# Patient Record
Sex: FEMALE | Race: WHITE | Employment: UNEMPLOYED | URBAN - METROPOLITAN AREA
[De-identification: names, ages, dates, MRNs, and addresses within clinical notes are randomized per-mention and may not be internally consistent; named-entity substitution may affect disease eponyms.]

---

## 2018-02-02 ENCOUNTER — TRANSFERRED RECORDS (OUTPATIENT)
Dept: HEALTH INFORMATION MANAGEMENT | Facility: CLINIC | Age: 38
End: 2018-02-02

## 2018-02-02 LAB
HPV ABSTRACT: NORMAL
PAP-ABSTRACT: NORMAL

## 2019-07-08 ENCOUNTER — OFFICE VISIT (OUTPATIENT)
Dept: FAMILY MEDICINE | Facility: CLINIC | Age: 39
End: 2019-07-08
Payer: COMMERCIAL

## 2019-07-08 VITALS
BODY MASS INDEX: 39.18 KG/M2 | TEMPERATURE: 97.7 F | DIASTOLIC BLOOD PRESSURE: 80 MMHG | WEIGHT: 243.8 LBS | SYSTOLIC BLOOD PRESSURE: 136 MMHG | RESPIRATION RATE: 24 BRPM | OXYGEN SATURATION: 100 % | HEART RATE: 109 BPM | HEIGHT: 66 IN

## 2019-07-08 DIAGNOSIS — F41.9 ANXIETY: Primary | ICD-10-CM

## 2019-07-08 DIAGNOSIS — M25.562 BILATERAL CHRONIC KNEE PAIN: ICD-10-CM

## 2019-07-08 DIAGNOSIS — G47.9 SLEEP DISTURBANCE: ICD-10-CM

## 2019-07-08 DIAGNOSIS — G89.29 BILATERAL CHRONIC KNEE PAIN: ICD-10-CM

## 2019-07-08 DIAGNOSIS — M54.50 CHRONIC BILATERAL LOW BACK PAIN WITHOUT SCIATICA: ICD-10-CM

## 2019-07-08 DIAGNOSIS — E66.812 CLASS 2 OBESITY WITHOUT SERIOUS COMORBIDITY WITH BODY MASS INDEX (BMI) OF 39.0 TO 39.9 IN ADULT, UNSPECIFIED OBESITY TYPE: ICD-10-CM

## 2019-07-08 DIAGNOSIS — M25.561 BILATERAL CHRONIC KNEE PAIN: ICD-10-CM

## 2019-07-08 DIAGNOSIS — G89.29 CHRONIC BILATERAL LOW BACK PAIN WITHOUT SCIATICA: ICD-10-CM

## 2019-07-08 PROBLEM — I10 ESSENTIAL HYPERTENSION: Status: ACTIVE | Noted: 2019-01-29

## 2019-07-08 PROBLEM — R91.8 MULTIPLE LUNG NODULES ON CT: Status: ACTIVE | Noted: 2018-02-21

## 2019-07-08 PROCEDURE — 99204 OFFICE O/P NEW MOD 45 MIN: CPT | Performed by: NURSE PRACTITIONER

## 2019-07-08 RX ORDER — SUMATRIPTAN 100 MG/1
TABLET, FILM COATED ORAL PRN
Refills: 5 | COMMUNITY
Start: 2019-07-02

## 2019-07-08 RX ORDER — AMITRIPTYLINE HYDROCHLORIDE 50 MG/1
1 TABLET ORAL AT BEDTIME
Refills: 3 | COMMUNITY
Start: 2019-07-02 | End: 2020-01-28

## 2019-07-08 RX ORDER — MIRTAZAPINE 30 MG/1
30 TABLET, FILM COATED ORAL AT BEDTIME
Qty: 90 TABLET | Refills: 1 | Status: SHIPPED | OUTPATIENT
Start: 2019-07-08 | End: 2019-11-20

## 2019-07-08 RX ORDER — OMEPRAZOLE 40 MG/1
40 CAPSULE, DELAYED RELEASE ORAL DAILY
COMMUNITY
Start: 2019-04-26

## 2019-07-08 RX ORDER — CHLORTHALIDONE 25 MG/1
25 TABLET ORAL DAILY
COMMUNITY
Start: 2019-04-26

## 2019-07-08 RX ORDER — MELOXICAM 5 MG/1
1 CAPSULE ORAL DAILY
COMMUNITY
End: 2019-08-07

## 2019-07-08 RX ORDER — ZOLPIDEM TARTRATE 5 MG/1
5 TABLET ORAL
COMMUNITY
Start: 2019-04-26 | End: 2019-07-08

## 2019-07-08 RX ORDER — ZOLPIDEM TARTRATE 5 MG/1
5 TABLET ORAL
Qty: 15 TABLET | Refills: 5 | Status: SHIPPED | OUTPATIENT
Start: 2019-07-08 | End: 2019-09-03

## 2019-07-08 RX ORDER — CELECOXIB 100 MG/1
400 CAPSULE ORAL DAILY
COMMUNITY
End: 2019-07-31

## 2019-07-08 RX ORDER — MIRTAZAPINE 15 MG/1
30 TABLET, FILM COATED ORAL AT BEDTIME
COMMUNITY
Start: 2019-07-02 | End: 2019-07-08

## 2019-07-08 RX ORDER — HYDROXYZINE HYDROCHLORIDE 10 MG/1
10-40 TABLET, FILM COATED ORAL AT BEDTIME
COMMUNITY
Start: 2019-04-26

## 2019-07-08 RX ORDER — FLUTICASONE PROPIONATE 220 UG/1
1 AEROSOL, METERED RESPIRATORY (INHALATION) DAILY
Refills: 5 | COMMUNITY
Start: 2019-06-30

## 2019-07-08 RX ORDER — ONDANSETRON 4 MG/1
TABLET, ORALLY DISINTEGRATING ORAL PRN
COMMUNITY
Start: 2019-04-26

## 2019-07-08 RX ORDER — CYCLOBENZAPRINE HCL 10 MG
10 TABLET ORAL AT BEDTIME
COMMUNITY
Start: 2019-04-26 | End: 2019-09-03

## 2019-07-08 RX ORDER — POTASSIUM CHLORIDE 1500 MG/1
20 TABLET, EXTENDED RELEASE ORAL DAILY
COMMUNITY
Start: 2019-04-26 | End: 2020-03-20

## 2019-07-08 RX ORDER — ATENOLOL 100 MG/1
100 TABLET ORAL DAILY
COMMUNITY
Start: 2019-04-26

## 2019-07-08 RX ORDER — DULOXETIN HYDROCHLORIDE 60 MG/1
60 CAPSULE, DELAYED RELEASE ORAL DAILY
COMMUNITY
Start: 2019-04-26 | End: 2020-01-28

## 2019-07-08 RX ORDER — ALBUTEROL SULFATE 90 UG/1
1 AEROSOL, METERED RESPIRATORY (INHALATION) DAILY
Refills: 5 | COMMUNITY
Start: 2019-05-23

## 2019-07-08 SDOH — HEALTH STABILITY: MENTAL HEALTH: HOW OFTEN DO YOU HAVE A DRINK CONTAINING ALCOHOL?: NEVER

## 2019-07-08 ASSESSMENT — PAIN SCALES - GENERAL: PAINLEVEL: SEVERE PAIN (7)

## 2019-07-08 ASSESSMENT — MIFFLIN-ST. JEOR: SCORE: 1847.62

## 2019-07-08 NOTE — PATIENT INSTRUCTIONS
Continue all medications without any changes    Schedule with Nutritionist     Try to increase daily cardio activity - at least 30 minutes a day     Schedule with Pain Management and Psychiatry     Would also be a good idea to start seeing a therapist to work on managing Anxiety  - some resources are provided below     Get feet up as able     Return to clinic in 3 months or sooner if needed

## 2019-07-08 NOTE — Clinical Note
Please abstract the following data from this visit with this patient into the appropriate field in Epic:Pap smear done on this date: 2/2/2018 (approximately), by this group: [facility], results were negative. ANDHPV done on this date: 2/2/2018 (approximately), by this group: [facility], results were negative

## 2019-07-08 NOTE — NURSING NOTE
"Chief Complaint   Patient presents with     Back Pain     Establish Care     wants to establish care until late fall-staying here for summer and fall       Initial /80   Pulse 109   Temp 97.7  F (36.5  C)   Resp 24   Ht 1.676 m (5' 6\")   Wt 110.6 kg (243 lb 12.8 oz)   SpO2 100%   Breastfeeding? No   BMI 39.35 kg/m   Estimated body mass index is 39.35 kg/m  as calculated from the following:    Height as of this encounter: 1.676 m (5' 6\").    Weight as of this encounter: 110.6 kg (243 lb 12.8 oz).    Patient presents to the clinic using No DME    Health Maintenance that is potentially due pending provider review:  Pap Smear    Pt reported. Sent to abstracting.    Is there anyone who you would like to be able to receive your results? not asked  If yes have patient fill out DANG    "

## 2019-07-08 NOTE — PROGRESS NOTES
SUBJECTIVE   Helena Berrios is a  female who presents to clinic today for the following health issue(s):     Chief Complaint   Patient presents with     Establish Care     wants to establish care until late fall-staying here for summer and fall     Pain     has chronic pain, requesting referral to pain clinic and psychiatrist   Musculoskeletal problem/pain      Duration: year plus/Chronic    Description  Location: Lower Back and bilateral knee pain chronic    Intensity:  moderate    Accompanying signs and symptoms: none    History  Previous similar problem: YES  Past Surgical History:   Procedure Laterality Date     ANKLE SURGERY Left ,      KNEE SURGERY Right 2007 x 2 surgeries     SHOULDER SURGERY Right 2012       Previous evaluation:  yes    Precipitating or alleviating factors:  Trauma or overuse: YES- knee pain from playing basketball and back pain from lifting heavy things  Aggravating factors include: sitting and stairs for knees and walking for back    Therapies tried and outcome: exercises (physical therapy), acupuncture not help and medications-Gabapentin was not helpful    Patient states was to go to pain clinic and psychiatrist, is looking for referrals in the area      If put hand on low back and it burns  Did acupuncture x 5 sessions for knees and back and pain was short lived  Did symbiscq injections and cortisone injections previously   History bilateral knee arthroscopies   No knee injuries or trauma    Back more bothersome in the last year, thinks due to weight  Is puffy - has had workup done on this   Was suppose to see a dietician in [place], but came up this way to care for family so hasn't gone   Dad with history of numerous MI's, first one was at age 37. Grandfather  of MI      Anxiety  Is currently on medication - was seeing psychiatry in [place]   Hasn't done counseling/therapy   Has been on multiple medications in the past, patient can't remember all        PCP   No primary  "care provider on file. None    Health Maintenance        Health Maintenance Due   Topic Date Due     PREVENTIVE CARE VISIT  1980     HIV SCREENING  11/30/1995   Pap and HPV done 2/2/2018-sent to abstracting    HPI        Patient Active Problem List   Diagnosis     Anxiety     Chronic pain of both knees     Essential hypertension     Backache     Multiple lung nodules on CT     Sleep disturbance     Current Outpatient Medications   Medication     atenolol (TENORMIN) 100 MG tablet     chlorthalidone (HYGROTON) 25 MG tablet     cyclobenzaprine (FLEXERIL) 10 MG tablet     DULoxetine (CYMBALTA) 60 MG capsule     hydrOXYzine (ATARAX) 10 MG tablet     levonorgestrel (MIRENA, 52 MG,) 20 MCG/24HR IUD     mirtazapine (REMERON) 30 MG tablet     omeprazole (PRILOSEC) 40 MG DR capsule     ondansetron (ZOFRAN-ODT) 4 MG ODT tab     order for DME     potassium chloride ER (K-DUR/KLOR-CON M) 20 MEQ CR tablet     Sharps Container (SHARPS-A-GATOR LOCKING BRACKET) MISC     zolpidem (AMBIEN) 5 MG tablet     amitriptyline (ELAVIL) 50 MG tablet     celecoxib (CELEBREX) 100 MG capsule     FLOVENT  MCG/ACT inhaler     Meloxicam 5 MG CAPS     ranitidine (ZANTAC) 300 MG tablet     SUMAtriptan (IMITREX) 100 MG tablet     VENTOLIN  (90 Base) MCG/ACT inhaler     No current facility-administered medications for this visit.        Reviewed and updated as needed this visit by Provider:  Tobacco  Allergies  Meds  Med Hx  Surg Hx  Fam Hx  Soc Hx     ROS:  Constitutional, neuro, ENT, endocrine, pulmonary, cardiac, gastrointestinal, genitourinary, musculoskeletal, integument and psychiatric systems are negative, except as otherwise noted.    PHYSICAL EXAM   /80   Pulse 109   Temp 97.7  F (36.5  C)   Resp 24   Ht 1.676 m (5' 6\")   Wt 110.6 kg (243 lb 12.8 oz)   SpO2 100%   Breastfeeding? No   BMI 39.35 kg/m    Body mass index is 39.35 kg/m .  GENERAL APPEARANCE: healthy, alert and no distress  EYES: Eyes grossly " normal to inspection, PERRL and conjunctivae and sclerae normal  HENT: ear canals and TM's normal and nose and mouth without ulcers or lesions  NECK: no adenopathy, no asymmetry, masses, or scars and thyroid normal to palpation  RESP: lungs clear to auscultation - no rales, rhonchi or wheezes  CV: regular rates and rhythm, normal S1 S2, no S3 or S4 and no murmur, click or rub  ABDOMEN: soft, nontender, without hepatosplenomegaly or masses, bowel sounds normal and obese  MS: extremities normal- no gross deformities noted, peripheral pulses normal and no edema  ORTHO: Knee Exam: Inspection: AP/lateral alignment normal, No effusion  Tender: generalized tenderness   Non-tender: remainder non-tender  Active Range of Motion: full flexion, no pain with flexion, full extension, no pain with extension  Strength: quad  5/5, Hamstrings  5/5, Gastroc  5/5, Tibialis anterior  5/5 and Peroneals  5/5    Lumber/Thoracic Spine Exam: Tender:  lumbar spinous processes, left para lumbar muscles, right para lumbar muscles  Non-tender:  Remainder non-tender  Range of Motion:  lumbar flexion  full, lumbar extension  full, left lateral lumbar bending  full, right lateral lumbar bending  full, left lateral lumbar rotation  full, right lateral lumbar rotation  full  Strength:  able to heel walk, able to toe walk, gastrocsoleus   5/5, hamstrings  5/5, quadriceps  5/5, tibialis anterior  5/5, peroneals  5/5  Special tests:  negative straight leg raises    SKIN: no suspicious lesions or rashes  NEURO: Normal strength and tone, mentation intact, speech normal and DTR symmetrically normal in lower extremities  PSYCH: mentation appears normal and affect normal/bright      Diagnostic Test Results:  none     ASSESSMENT & PLAN   Assessment & Plan     1. Anxiety  Chronic, was seeing psychiatry at last clinic. Patient up taking care of family currently. Would like to find a psychiatrist closer for medication management. Continue Remeron, schedule with  "psychiatry for medication management. Also discussed therapy/counseling.   - MENTAL HEALTH REFERRAL  - Adult; Psychiatry and Medication Management; Psychiatry; [facility]: [facility] [phone].  Medication management & future refills will be returned to [facility] PCP upon completion of evaluation; We july...  - mirtazapine (REMERON) 30 MG tablet; Take 1 tablet (30 mg) by mouth At Bedtime  Dispense: 90 tablet; Refill: 1    2. Sleep disturbance  Chronic, stable on Ambien. Discussed bedtime hygiene as well. Continue Ambien without any changes.   - zolpidem (AMBIEN) 5 MG tablet; Take 1 tablet (5 mg) by mouth nightly as needed for sleep To last 30 days  Dispense: 15 tablet; Refill: 5    3. Chronic bilateral low back pain without sciatica  Chronic, stable. Has trialed multiple therapies as well as physical therapy. Pain management referral placed.   - PAIN MANAGEMENT REFERRAL    4. Bilateral chronic knee pain  Chronic, still painful. Has had bilateral arthroscopies. Schedule with pain management.   - PAIN MANAGEMENT REFERRAL    5. Class 2 obesity without serious comorbidity with body mass index (BMI) of 39.0 to 39.9 in adult, unspecified obesity type  Chronic, was suppose to schedule with nutrition before temporary move. Nutrition referral placed.   - NUTRITION REFERRAL     BMI:   Estimated body mass index is 39.35 kg/m  as calculated from the following:    Height as of this encounter: 1.676 m (5' 6\").    Weight as of this encounter: 110.6 kg (243 lb 12.8 oz).   Weight management plan: Discussed healthy diet and exercise guidelines and Nutrition referral        Patient Instructions   Continue all medications without any changes    Schedule with Nutritionist     Try to increase daily cardio activity - at least 30 minutes a day     Schedule with Pain Management and Psychiatry     Would also be a good idea to start seeing a therapist to work on managing Anxiety  - some resources are provided below     Get " feet up as able     Return to clinic in 3 months or sooner if needed          Return in about 3 years (around 7/8/2022) for Recheck.    Risks, benefits, side effects and rationale for treatment plan fully discussed with the patient and understanding expressed.    ESTEBAN Giraldo CNP   [facility]

## 2019-07-24 ENCOUNTER — OFFICE VISIT (OUTPATIENT)
Dept: FAMILY MEDICINE | Facility: CLINIC | Age: 39
End: 2019-07-24
Payer: COMMERCIAL

## 2019-07-24 VITALS
OXYGEN SATURATION: 96 % | WEIGHT: 239 LBS | DIASTOLIC BLOOD PRESSURE: 70 MMHG | HEART RATE: 112 BPM | BODY MASS INDEX: 38.58 KG/M2 | TEMPERATURE: 98.3 F | SYSTOLIC BLOOD PRESSURE: 128 MMHG | RESPIRATION RATE: 18 BRPM

## 2019-07-24 DIAGNOSIS — E66.01 MORBID OBESITY (H): ICD-10-CM

## 2019-07-24 DIAGNOSIS — G89.29 CHRONIC BILATERAL LOW BACK PAIN WITHOUT SCIATICA: ICD-10-CM

## 2019-07-24 DIAGNOSIS — R06.00 PND (PAROXYSMAL NOCTURNAL DYSPNEA): ICD-10-CM

## 2019-07-24 DIAGNOSIS — R91.8 PULMONARY NODULES: ICD-10-CM

## 2019-07-24 DIAGNOSIS — M54.50 CHRONIC BILATERAL LOW BACK PAIN WITHOUT SCIATICA: ICD-10-CM

## 2019-07-24 DIAGNOSIS — R60.0 BILATERAL LOWER EXTREMITY EDEMA: Primary | ICD-10-CM

## 2019-07-24 LAB
ALBUMIN SERPL-MCNC: 3.6 G/DL (ref 3.4–5)
ALP SERPL-CCNC: 118 U/L (ref 40–150)
ALT SERPL W P-5'-P-CCNC: 32 U/L (ref 0–50)
ANION GAP SERPL CALCULATED.3IONS-SCNC: 3 MMOL/L (ref 3–14)
AST SERPL W P-5'-P-CCNC: 15 U/L (ref 0–45)
BILIRUB SERPL-MCNC: 0.2 MG/DL (ref 0.2–1.3)
BUN SERPL-MCNC: 6 MG/DL (ref 7–30)
CALCIUM SERPL-MCNC: 8.8 MG/DL (ref 8.5–10.1)
CHLORIDE SERPL-SCNC: 106 MMOL/L (ref 94–109)
CO2 SERPL-SCNC: 29 MMOL/L (ref 20–32)
CREAT SERPL-MCNC: 0.86 MG/DL (ref 0.52–1.04)
GFR SERPL CREATININE-BSD FRML MDRD: 85 ML/MIN/{1.73_M2}
GLUCOSE SERPL-MCNC: 87 MG/DL (ref 70–99)
POTASSIUM SERPL-SCNC: 3.9 MMOL/L (ref 3.4–5.3)
PROT SERPL-MCNC: 7.4 G/DL (ref 6.8–8.8)
SODIUM SERPL-SCNC: 138 MMOL/L (ref 133–144)

## 2019-07-24 PROCEDURE — 36415 COLL VENOUS BLD VENIPUNCTURE: CPT | Performed by: NURSE PRACTITIONER

## 2019-07-24 PROCEDURE — 99214 OFFICE O/P EST MOD 30 MIN: CPT | Performed by: NURSE PRACTITIONER

## 2019-07-24 PROCEDURE — 80053 COMPREHEN METABOLIC PANEL: CPT | Performed by: NURSE PRACTITIONER

## 2019-07-24 RX ORDER — FUROSEMIDE 20 MG
1 TABLET ORAL DAILY
COMMUNITY
Start: 2018-08-30

## 2019-07-24 NOTE — PROGRESS NOTES
SUBJECTIVE   Helena Berrios is a  female who presents to clinic today for the following health issue(s):       Back Pain       Duration: chronic but worse past week or so        Specific cause: none    Description:   Location of pain: low back both and bilateral leg pain  Character of pain: sharp  Pain radiation:radiates into the right leg and radiates into the left leg  New numbness or weakness in legs, not attributed to pain:  no     Intensity: At its worst 10/10    History:   Pain interferes with job: YES  History of back problems: back problems for 1-2 years, does not see specialist  Any previous MRI or X-rays: None  Sees a specialist for back pain:  No  Therapies tried without relief: tens unit    Alleviating factors:   Improved by: sleep      Precipitating factors:  Worsened by: Lifting, Bending and Standing    Accompanying Signs & Symptoms:  Risk of Fracture:  None  Risk of Cauda Equina:  None  Risk of Infection:  None  Risk of Cancer:  None  Risk of Ankylosing Spondylitis:  Onset at age <35, male, AND morning back stiffness. no     Was seen in clinic approximately 2 weeks ago and was provided a pain management referral, which patient cancelled. Patient reports seeing pain clinic on Monday in [place]- see's again on September 3rd       edema      Duration: 1 week    Description (location/character/radiation): hand, ankle and feet swelling    Intensity:  severe    Accompanying signs and symptoms: racing heart    History (similar episodes/previous evaluation): has had in past but no known diagnosis    Precipitating or alleviating factors: None, father has CHF so patient is concerned    Therapies tried and outcome: Lasix and potassium has not helped       L > R; up into lower legs more   No significant salt intake  Having nocturnal dyspnea    Tried compression stockings - are helpful in winter   Dad just had MI so is worried about this  Was ordered to have a stress echo in July of 2018 - no results seen.  Patient reports not having.    PCP   Physician No Ref-Primary None    Health Maintenance        Health Maintenance Due   Topic Date Due     PREVENTIVE CARE VISIT  1980     HIV SCREENING  11/30/1995       HPI        Patient Active Problem List   Diagnosis     Anxiety     Chronic pain of both knees     Essential hypertension     Backache     Multiple lung nodules on CT     Sleep disturbance     Obesity (BMI 35.0-39.9) with comorbidity (H)     Segmental dysfunction of sacral region     Segmental dysfunction of lumbar region     Segmental dysfunction of thoracic region     Segmental dysfunction of lower extremity     Lumbago     Current Outpatient Medications   Medication     amitriptyline (ELAVIL) 50 MG tablet     atenolol (TENORMIN) 100 MG tablet     celecoxib (CELEBREX) 100 MG capsule     chlorthalidone (HYGROTON) 25 MG tablet     cyclobenzaprine (FLEXERIL) 10 MG tablet     DULoxetine (CYMBALTA) 60 MG capsule     FLOVENT  MCG/ACT inhaler     hydrOXYzine (ATARAX) 10 MG tablet     levonorgestrel (MIRENA, 52 MG,) 20 MCG/24HR IUD     Meloxicam 5 MG CAPS     mirtazapine (REMERON) 30 MG tablet     omeprazole (PRILOSEC) 40 MG DR capsule     ondansetron (ZOFRAN-ODT) 4 MG ODT tab     order for DME     potassium chloride ER (K-DUR/KLOR-CON M) 20 MEQ CR tablet     ranitidine (ZANTAC) 300 MG tablet     Sharps Container (SHARPS-A-GATOR LOCKING BRACKET) MISC     SUMAtriptan (IMITREX) 100 MG tablet     VENTOLIN  (90 Base) MCG/ACT inhaler     zolpidem (AMBIEN) 5 MG tablet     furosemide (LASIX) 20 MG tablet     order for DME     No current facility-administered medications for this visit.        Reviewed and updated as needed this visit by Provider:  Tobacco  Allergies  Meds  Med Hx  Surg Hx  Fam Hx  Soc Hx     ROS:  Constitutional, neuro, ENT, endocrine, pulmonary, cardiac, gastrointestinal, genitourinary, musculoskeletal, integument and psychiatric systems are negative, except as otherwise  noted.    PHYSICAL EXAM   /70   Pulse 112   Temp 98.3  F (36.8  C)   Resp 18   Wt 108.4 kg (239 lb)   SpO2 96%   Breastfeeding? No   BMI 38.58 kg/m    Body mass index is 38.58 kg/m .  GENERAL APPEARANCE: healthy, alert and no distress  EYES: Eyes grossly normal to inspection, PERRL and conjunctivae and sclerae normal  HENT: ear canals and TM's normal and nose and mouth without ulcers or lesions  NECK: no adenopathy, no asymmetry, masses, or scars and thyroid normal to palpation  RESP: lungs clear to auscultation - no rales, rhonchi or wheezes  CV: regular rates and rhythm, normal S1 S2, no S3 or S4 and no murmur, click or rub  ABDOMEN: soft, nontender, without hepatosplenomegaly or masses, bowel sounds normal and obese  MS: extremities normal- no gross deformities noted, peripheral pulses normal and 2+ pitting edema on left and 1+ edema on right  SKIN: no suspicious lesions or rashes  NEURO: Normal strength and tone, mentation intact and speech normal  PSYCH: mentation appears normal and affect normal/bright      Diagnostic Test Results:  Pending     ASSESSMENT & PLAN   Assessment & Plan     1. Bilateral lower extremity edema  Patient has persistent bilateral lower extremities swelling, had seen previous primary care provider for and has been on Lasix. Patient was also advised to have stress echo completed approximately 1 year ago, no records seen and patient reports never having done. Due to bilateral lower extremity swelling will get repeat lab work, schedule for Echo and also get SABRINA. Advised patient on low sodium diet and elevation of lower extremities.   - Comprehensive metabolic panel (BMP + Alb, Alk Phos, ALT, AST, Total. Bili, TP)  - Echocardiogram Complete; Future  - US SABRINA Doppler No Exercise; Future    2. Pulmonary nodules  History of pulmonary nodules on CT scan in 2018. It was recommended at that time to repeat in 6 months. Order placed.   - CT Chest w Contrast; Future    3. PND (paroxysmal  "nocturnal dyspnea)    - Echocardiogram Complete; Future    4. Morbid obesity (H)  Discussed risks of increased weight and diet and exercise to help manage.     5. Chronic bilateral low back pain without sciatica  Chronic, ordered for pain management referral at last office visit, however patient cancelled. Patient reports seeing pain management in [place]. Will get DANG for records. Patient was unable to provide name of place. Took DANG to complete and return to clinic.        BMI:   Estimated body mass index is 38.58 kg/m  as calculated from the following:    Height as of 7/8/19: 1.676 m (5' 6\").    Weight as of this encounter: 108.4 kg (239 lb).   Weight management plan: Discussed healthy diet and exercise guidelines        Patient Instructions   Schedule Echocardiogram of the heart at [phone]    Will also do an ultrasound of lower legs call [phone] to set this up.     Could see if you could get them both scheduled for the same day    Lab work today  - will call you with results     Will also call with Echo and ultrasound results     Try to increase exercise and stick to a healthy diet. Would still encourage to meet with nutritionist.    See pain management as scheduled - will get a release of information for your visit with them.     Should not be on lasix per records    Will determine follow up after tests all back       Return in about 1 month (around 8/24/2019) for Recheck.    Risks, benefits, side effects and rationale for treatment plan fully discussed with the patient and understanding expressed.    ESTEBAN Giraldo [facility]    "

## 2019-07-24 NOTE — NURSING NOTE
"Chief Complaint   Patient presents with     Back Pain       Initial Breastfeeding? No  Estimated body mass index is 39.35 kg/m  as calculated from the following:    Height as of 7/8/19: 1.676 m (5' 6\").    Weight as of 7/8/19: 110.6 kg (243 lb 12.8 oz).    Patient presents to the clinic using No DME    Health Maintenance that is potentially due pending provider review:  NONE    n/a    Is there anyone who you would like to be able to receive your results? not asked  If yes have patient fill out DANG    "

## 2019-07-24 NOTE — PATIENT INSTRUCTIONS
Schedule Echocardiogram of the heart at [phone]    Will also do an ultrasound of lower legs call [phone] to set this up.     Could see if you could get them both scheduled for the same day    Lab work today  - will call you with results     Will also call with Echo and ultrasound results     Try to increase exercise and stick to a healthy diet. Would still encourage to meet with nutritionist.    See pain management as scheduled - will get a release of information for your visit with them.     Should not be on lasix per records    Will determine follow up after tests all back

## 2019-07-26 ENCOUNTER — MYC MEDICAL ADVICE (OUTPATIENT)
Dept: FAMILY MEDICINE | Facility: CLINIC | Age: 39
End: 2019-07-26

## 2019-07-26 ENCOUNTER — THERAPY VISIT (OUTPATIENT)
Dept: CHIROPRACTIC MEDICINE | Facility: CLINIC | Age: 39
End: 2019-07-26
Payer: COMMERCIAL

## 2019-07-26 DIAGNOSIS — M99.06 SEGMENTAL DYSFUNCTION OF LOWER EXTREMITY: ICD-10-CM

## 2019-07-26 DIAGNOSIS — M99.03 SEGMENTAL DYSFUNCTION OF LUMBAR REGION: ICD-10-CM

## 2019-07-26 DIAGNOSIS — M99.04 SEGMENTAL DYSFUNCTION OF SACRAL REGION: Primary | ICD-10-CM

## 2019-07-26 DIAGNOSIS — M54.50 LUMBAGO: ICD-10-CM

## 2019-07-26 DIAGNOSIS — M99.02 SEGMENTAL DYSFUNCTION OF THORACIC REGION: ICD-10-CM

## 2019-07-26 PROCEDURE — 98941 CHIROPRACT MANJ 3-4 REGIONS: CPT | Mod: AT | Performed by: CHIROPRACTOR

## 2019-07-26 PROCEDURE — 99203 OFFICE O/P NEW LOW 30 MIN: CPT | Mod: 25 | Performed by: CHIROPRACTOR

## 2019-07-26 PROCEDURE — 98943 CHIROPRACT MANJ XTRSPINL 1/>: CPT | Performed by: CHIROPRACTOR

## 2019-07-26 NOTE — PROGRESS NOTES
"Initial Chiropractic Clinic Visit    PCP: No Ref-Primary, Physician    Helena Berrios is a 38 year old female who is seen  as a self referral presenting with lower back pain and bilateral knee pain that she attributes to possible her weight gain. It has been ongoing for about 5 years. She believes that repetitive use may also be a factor. She rates her current pain 8/10. She points to pain in her central lower back around the L5 region, and she describes it as burning. Her knee pain has been worsening the last 12 years, the LBP the last 6 months. There is no known cause, but she has gained a lot of weight the last year. When she was mowing the last last night, she had some radiation of symptoms down to her knees and they feel weak. She is taking Flexeril for pain, but that is for her neck. She has not used ice or heat, but the TENs unit is just a quick fix. She is not sleeping well at night. She went to a chiropractor about 20 years ago. She has had injections in her knees helped for a couple weeks, she has also had PT which did not work.         Injury: None known    Location of Pain: central L5 pain and bilateral superior aspect of knees  Duration of Pain: \"years\"   Rating of Pain at worst: 10/10  Rating of Pain Currently: 8/10  Symptoms are better with: Nothing  Symptoms are worse with: repetitive chores, lawn, dishes  Additional Features: some radiation of symptoms to knees     Health History  as reported by the patient:    How does the patient rate their own health:   Good    Current or past medical history:   Calf pain/knee, Overweight, Pain at night/rest and Smoking - patient notes that she just cut back to \"one a day\"    Medical allergies:  Multiple - see above    Past Traumas/Surgeries:  Ortho - knee 2, ankle 3, shoulder 1    Family History:  Cancer, DM2, heart disease    Medications:  Muscle relaxants and Sleep    Occupation:  None, took summer off while she lives with aunt; 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  Disability score: 34%. Keel Start Total Score:7 Sub Score: 4     Review of Systems  Musculoskeletal: as above  Remainder of review of systems is negative including constitutional, CV, pulmonary, GI, Skin and Neurologic except as noted in HPI or medical history.    No past medical history on file.  Past Surgical History:   Procedure Laterality Date     ANKLE SURGERY Left 1997, 2003     KNEE SURGERY Right 2007 x 2 surgeries     SHOULDER SURGERY Right 2012     Objective  There were no vitals taken for this visit.      GENERAL APPEARANCE: healthy, alert and no distress   GAIT: NORMAL  SKIN: no suspicious lesions or rashes  NEURO: Normal strength and tone, mentation intact and speech normal  PSYCH:  mentation appears normal and affect normal/bright    Low back exam:    Inspection:       no visible deformity in the low back    ROM:       WNL but Extension and Rotation cause \"burning\"    Tender:  L5 region, superior aspect of bilateral knees      Strength:       ankle dorsiflexion 5/5 Normal       ankle plantarflexion 5/5 Normal       dorsiflexion of the great toe 5/5 Normal    Reflexes:       patellar (L3, L4) 2 bilaterally    Sensation:      grossly intact throughout lower extremities    Special tests:  Heel walk - Right negative and Left negative, Toe walk -  Right negative and Left negative, Milgrams - negative, Valsalva - negative, Kemps - Right negative and Left negative, SLR - Right negative and Left negative, Gaenslen's - Right negative and Left negative, Fabere - Right negative and Left negative, Yeoman's - Right negative and Left negative, Clinton - Right positive and Left positive and Ely's - Right positive and Left positive    Segmental spinal " dysfunction/restrictions found at:  T1 , T5 , T10, L4 , PSIS Right  and Extra-spinal:      Muscle spasm found in:Gluteal and Lumbar erector spine      Radiology:  None warranted at this time, consider if no improvement with conservative management.     Assessment:    No diagnosis found.    RX ordered/plan of care: Mechanical LBP and bilateral knee pain with associated myospasm and intersegmental dysfunction.   Anticipated outcomes: Patient is expected to get relief with care.   Possible risks and side effects: Minimal soreness expected post-adjustment.     After discussing the risk and benefits of care, patient consented to treatment.    Prognosis: Good      Patient's condition:  Patient had restrictions pre-manipulation and Patient had decreased motion prior to manipulation    Treatment effectiveness:  Post manipulation there is better intersegmental movement and Patient claims to feel looser post manipulation      Plan:    Procedures:  Evaluation and Management:  54489 Moderate level exam 30 min    CMT:  27920 Chiropractic manipulative treatment 3-4 regions performed   30803 Chiropractic manipulative treatment extraspinal dysfunction/restriction  Thoracic: Diversified, T1, T5, T10, Prone  Lumbar: Drop Table, L4, Prone  Pelvis: Drop Table, PSIS Right , Prone  Extra-spinal: Drop Table, bilateral knees from S to I and fibular head from A to P with Thuli Board, Supine    Modalities:  Flexion Distraction to stretch lumbar paraspinals.     Therapeutic procedures:  Gave patient Ice instructions post adjustment, and instructions for acute care    Response to Treatment:  Patient tolerated treatment well today.       Treatment plan and goals:  Goals:  Decrease pain in lower back and bilateral knees.  Support patient in returning to work and weight loss.     Frequency of care  Duration of care is estimated to be 6 weeks, from the initial treatment.  It is estimated that the patient will need a total of 8 visits to resolve  this episode.  For the initial therapeutic trial of care, the frequency is recommended at 1-2 times per week.  A reevaluation would be clinically appropriate in 8 visits, to determine progress and further course of care.    In-Office Treatment  Evaluation  Spinal Chiropractic Manipulative Therapy:  Trial of care - re-evaluate after 8 visits.         Recommendations:    Instructions:  ice 20 minutes every other hour as needed, heat 15 minutes every other hour as needed and use TENs unit    Follow-up:    Return to care next week.       Discussed the assessment with the patient.      Disclaimer: This note consists of symbols derived from keyboarding, dictation and/or voice recognition software. As a result, there may be errors in the script that have gone undetected. Please consider this when interpreting information found in this chart.

## 2019-07-29 NOTE — TELEPHONE ENCOUNTER
Left message for patient to call back. Need to know where she wants us to send this script for the TENS unit. Script is on CSS desk in [place].    Kristy [facility] [place]

## 2019-07-31 ENCOUNTER — MYC MEDICAL ADVICE (OUTPATIENT)
Dept: PALLIATIVE MEDICINE | Facility: CLINIC | Age: 39
End: 2019-07-31

## 2019-07-31 ENCOUNTER — OFFICE VISIT (OUTPATIENT)
Dept: PALLIATIVE MEDICINE | Facility: CLINIC | Age: 39
End: 2019-07-31
Payer: COMMERCIAL

## 2019-07-31 ENCOUNTER — THERAPY VISIT (OUTPATIENT)
Dept: CHIROPRACTIC MEDICINE | Facility: CLINIC | Age: 39
End: 2019-07-31
Payer: COMMERCIAL

## 2019-07-31 VITALS
HEIGHT: 66 IN | WEIGHT: 240 LBS | SYSTOLIC BLOOD PRESSURE: 138 MMHG | TEMPERATURE: 98.6 F | DIASTOLIC BLOOD PRESSURE: 80 MMHG | BODY MASS INDEX: 38.57 KG/M2

## 2019-07-31 DIAGNOSIS — M54.50 CHRONIC BILATERAL LOW BACK PAIN WITHOUT SCIATICA: ICD-10-CM

## 2019-07-31 DIAGNOSIS — M25.561 CHRONIC PAIN OF BOTH KNEES: Primary | ICD-10-CM

## 2019-07-31 DIAGNOSIS — G89.29 CHRONIC BILATERAL LOW BACK PAIN WITHOUT SCIATICA: ICD-10-CM

## 2019-07-31 DIAGNOSIS — M99.04 SEGMENTAL DYSFUNCTION OF SACRAL REGION: Primary | ICD-10-CM

## 2019-07-31 DIAGNOSIS — M99.02 SEGMENTAL DYSFUNCTION OF THORACIC REGION: ICD-10-CM

## 2019-07-31 DIAGNOSIS — M99.03 SEGMENTAL DYSFUNCTION OF LUMBAR REGION: ICD-10-CM

## 2019-07-31 DIAGNOSIS — M99.06 SEGMENTAL DYSFUNCTION OF LOWER EXTREMITY: ICD-10-CM

## 2019-07-31 DIAGNOSIS — G47.00 INSOMNIA, UNSPECIFIED TYPE: ICD-10-CM

## 2019-07-31 DIAGNOSIS — G89.29 CHRONIC PAIN OF BOTH KNEES: Primary | ICD-10-CM

## 2019-07-31 DIAGNOSIS — M25.562 CHRONIC PAIN OF BOTH KNEES: Primary | ICD-10-CM

## 2019-07-31 PROCEDURE — 98943 CHIROPRACT MANJ XTRSPINL 1/>: CPT | Mod: GA | Performed by: CHIROPRACTOR

## 2019-07-31 PROCEDURE — 99204 OFFICE O/P NEW MOD 45 MIN: CPT | Performed by: PHYSICIAN ASSISTANT

## 2019-07-31 PROCEDURE — 98941 CHIROPRACT MANJ 3-4 REGIONS: CPT | Mod: AT | Performed by: CHIROPRACTOR

## 2019-07-31 RX ORDER — CELECOXIB 200 MG/1
200 CAPSULE ORAL DAILY
Qty: 30 CAPSULE | Refills: 2 | Status: SHIPPED | OUTPATIENT
Start: 2019-07-31 | End: 2020-03-20

## 2019-07-31 ASSESSMENT — PAIN SCALES - GENERAL: PAINLEVEL: EXTREME PAIN (9)

## 2019-07-31 ASSESSMENT — MIFFLIN-ST. JEOR: SCORE: 1785.38

## 2019-07-31 NOTE — PATIENT INSTRUCTIONS
After Visit Instructions:     Thank you for coming to [facility] for your care. It is my goal to partner with you to help you reach your optimal state of health.     I am recommending multidisciplinary care at this time.  The focus of care will be to continue gradual rehabilitation and pain management with medication adjustments as needed.    Continue daily self-care, identifying contributing factors, and monitoring variations in pain level. Continue to integrate self-care into your life.          Schedule follow-up with Tika Knowles PA-C in 6-8 weeks. You will need to make this appointment.     Procedures recommended: genicular nerve block preceding to a radiofrequency ablation     Referral for sleep study    Medication recommendations:     Stop the Meloxicam    Celebrex 200mg at bedtime with food    Voltaren Gel 1%: apply 2g to knees 4 times a day      Tika Knowles PA-C  [facility]  [place]    Contact information: [facility]  Clinic Number:  [phone]     Call with any questions about your care and for scheduling assistance.     Calls are returned Monday through Friday between 8 AM and 4:30 PM. We usually get back to you within 2 business days depending on the issue/request.    If we are prescribing your medications:    For opioid medication refills, call the clinic or send a Presdo message 7 days in advance.  Please include:    Name of requested medication    Name of the pharmacy.    For non-opioid medications, call your pharmacy directly to request a refill. Please allow 3-4 days to be processed.     Per [place] State Law:    All controlled substance prescriptions must be filled within 30 days of being written.      For those controlled substances allowing refills, pickup must occur within 30 days of last fill.      We believe regular attendance is key to your success in our program!      Any time you are unable to keep your  appointment we ask that you call us at least 24 hours in advance to cancel.This will allow us to offer the appointment time to another patient.   Multiple missed appointments may lead to dismissal from the clinic.

## 2019-07-31 NOTE — PROGRESS NOTES
[facility] Consultation      This patient is being seen in consultation at the request of her provider Evelyn Cazares, ESTEBAN HOLLAND.    Primary Care Provider is No Ref-Primary, Physician.    Please see the [facility] health questionnaire which the patient completed and reviewed with me in detail    CHIEF COMPLAINT:  Low Back pain  Bilateral knee pain    HISTORY OF PRESENT ILLNESS:  Helena Berrios is a 38 year old female with history of low back pain and knee pain.      Her low back pain started within the last year with her weight gain. She reports getting a burning sensation in her lower back. She is working with the chiropractor on this. She does report getting pain into her legs as well. She denies any numbness or tingling. She denies any loss of bladder or bowel control. She reports getting muscle spasms in her legs, R>L.     She has had knee pain since high school. She has had a surgery on both of her knees 12 years ago. The surgery helped 12 years ago. She tried the synvisc and cortisone but they only gave temporary relief. The lidocaine patches provide temporary relief. She does feel that her knees give out on her. She states that if she is in a crouching position she feels that her knees lock up and she is unable to get up.  Right now she feels a continuous throbbing in the knee.       Pain Information:   Onset/Progression:  Pain started 5-6 years ago.   Pain quality: Aching    Pain timing: Constant     Pain rating: intensity ranges from 3/10 to 10/10, and averages 7/10 on a 0-10 scale.   Aggravating factors include: Sitting tight space   Relieving factors include: continued movement      Past Pain Treatments:   Pain Clinic:   Yes, [facility]   PT: Yes, did aquatic therapy 07/2018 was not helpful  Psychologist: No  Relaxation techniques/biofeedback: No  Chiropractor: Yes, currently work with on low back  Acupuncture: Yes, was helpful for  headaches  Pharmacotherapy:    Opioids: No     Non-opioids:  Yes   TENs Unit:Yes, helpful while on  Injections: Yes, synvisc and cortisone  Self-care:   Yes, ice after riding  Surgeries related to pain: Yes, one on each knee 12 years ago, no knee replacement     Current Pain Relevant Medications:    Amitriptyline 50mg at bedtime  Celebrex 100mg at bedtime  Flexeril 10mg-takes 3 at bedtime  Cymbalta 60mg-takes at bedtime  Atarax 10mg-takes at bedtime  Meloxicam 15mg at bedtime  Imitrex-uses rarely  Ambien-5mg at bedtime      Previous Pain Relevant Medications: (H--helped; HI--Helped initially; SWH--Somewhat helpful; NH--No help; W--worse; SE--side effects; ?--Unsure if helpful)   NOTE: This medication information taken from patient's intake form, not medical records.   Opiates: none  NSAIDS: Celebrex NH, Ibuprofen NH, Aleve SE GI   Anti-migraine mediations: Imitrex [facility] SE groggy  Muscle Relaxants: Flexeril H for neck/shoulder  Neuropathics: Gabapentin NH  Anti-depressants: Amitriptyline NH, Cymbalta NH  Anxiolytics: none  Topicals: Lidocaine [facility],   Sleep aids: Ambien SW  Other medications not covered above: Steroid SE itching    FAMILY MEDICAL HISTORY:  Chronic pain: Yes, father with chronic pain  Family history of headaches:  Yes, father      PAST MEDICAL HISTORY:   No past medical history on file.      HEALTH AND LIFESTYLE PRACTICES:  Have you ever had any problems with alcohol or drug use? no  Have you ever felt you should cut down your use of alcohol/drugs?no  Have people ever annoyed you by criticizing your alcohol/drug use? no  Have you ever felt bad or guilty about your alcohol/drug use? no  Have you ever had a drink or taken a drug first thing in the morning for an eye-opener/hangover? no    SLEEP:  Do you snore heavily? yes  Do you wake up feeling rested? no  How many hours of sleep do you average per day? 5  What keep you from sleep? Leg pain and anxiety  Have you been diagnosed with sleep apnea? no  Do  you wear a CPAP? no      ALLERGIES:  Allergies   Allergen Reactions     Naproxen GI Disturbance and Hives     Oxycodone-Acetaminophen Hives     Prednisone Other (See Comments)     Sick to stomach  Vomiting     Quetiapine Other (See Comments)       MEDICATIONS:  Current Outpatient Medications   Medication Sig Dispense Refill     amitriptyline (ELAVIL) 50 MG tablet 1 tablet At Bedtime  3     atenolol (TENORMIN) 100 MG tablet Take 100 mg by mouth daily       celecoxib (CELEBREX) 100 MG capsule Take 400 mg by mouth daily       chlorthalidone (HYGROTON) 25 MG tablet Take 25 mg by mouth daily       cyclobenzaprine (FLEXERIL) 10 MG tablet Take 10 mg by mouth At Bedtime       DULoxetine (CYMBALTA) 60 MG capsule Take 60 mg by mouth daily       FLOVENT  MCG/ACT inhaler 1 puff daily  5     furosemide (LASIX) 20 MG tablet 1 tablet daily       hydrOXYzine (ATARAX) 10 MG tablet Take 10-40 mg by mouth At Bedtime       levonorgestrel (MIRENA, 52 MG,) 20 MCG/24HR IUD 1 Device by Intrauterine route       Meloxicam 5 MG CAPS Take 1 capsule by mouth daily       mirtazapine (REMERON) 30 MG tablet Take 1 tablet (30 mg) by mouth At Bedtime 90 tablet 1     omeprazole (PRILOSEC) 40 MG DR capsule Take 40 mg by mouth daily       ondansetron (ZOFRAN-ODT) 4 MG ODT tab as needed       order for DME Equipment being ordered: TENS 1 each 0     order for DME ZYNEX TENS unit.       potassium chloride ER (K-DUR/KLOR-CON M) 20 MEQ CR tablet Take 20 mEq by mouth daily       ranitidine (ZANTAC) 300 MG tablet Take 300 mg by mouth daily  3     Sharps Container (SHARPS-A-GATOR LOCKING BRACKET) MISC For personal use. Length: calf Strength: 20-30 mmHg       SUMAtriptan (IMITREX) 100 MG tablet as needed  5     VENTOLIN  (90 Base) MCG/ACT inhaler 1 puff daily  5     zolpidem (AMBIEN) 5 MG tablet Take 1 tablet (5 mg) by mouth nightly as needed for sleep To last 30 days 15 tablet 5         REVIEW OF SYSTEMS:   Constitutional:  Fatigue and Weight  "Gain  Eyes/Head: Headache  Ears/Nose/Throat: Negative  Allergy/Immune: Negative  Skin:Negative  Hematologic/Lymphatic/Immunologic:Negative  Respiratory: Negative  Cardiovascular: Palpitations and Swelling in feet  Gastrointestinal: Negative  Endocrine: Negative  Musculoskeletal: Back pain and Joint pain  Urinary:  Negative   Any bowel or bladder incontinence: Denies   Neurologic: Negative  Psychiatric: Anxiety and Stress    CURRENT FAMILY/SOCIAL SITUATION:  Living situation: patient is . She lives with her , daughter and father  Support system: She reports having a good support system  Occupation: She is not currently working    ABUSE/ASSAULT HISTORY:   Physical: no  Emotional:  no  Sexual: no  Childhood Sexual Abuse: no    SUBSTANCE USE:  Any illicit drug use: marijuana in the 1990's  EtOH use: occasional use  Caffeine use: coffee daily  Nicotine use: 1 cigarette/day  Any use of prescriptions other than how they were prescribed: none    PHYSICAL EXAM    Vitals:   /80   Temp 98.6  F (37  C) (Temporal)   Ht 1.676 m (5' 6\")   Wt 108.9 kg (240 lb)   BMI 38.74 kg/m    Body mass index is 38.74 kg/m .  5' 6\"  240 lbs 0 oz      Appearance:     A&O. Patient is appropriate.   Patient is in NAD.   Patient is well groomed and appears stated age.     HEENT:   Normocephalic, atraumatic, sclera, conjunctiva and pharynx normal. Pupils are equal, round and reactive to light. Hearing is adequate for exam. Uvula rises with phonation.   Neck: Supple. No deformities or adenopathy  Cardiovascular:  Heart has a regular rate and rhythm. Audible S1 and S2. No murmurs auscultated. No edema on bilateral lower extremities.   Respiratory: Lungs are clear to auscultation bilaterally. No wheezes or crackles.  Skin:  No rashes, erythema, breakdowns, lesions to exposed skin.   Hematologic:  No bruises, petechiae or ecchymosis to exposed areas.  Psychiatric:  mentation appears normal., affect and mood " normal  Musculoskeletal:  Posture upright, shoulders and pelvis are leveled.   Deltoid: R: 5/5 L: 5/5  Biceps: R: 5/5 L: 5/5  Triceps: R: 5/5 L: 5/5  Intrinsic hand: R: 5/5   L: 5/5  Hip flexion: R: 5/5 L: 5/5  Knee ext: R: 5/5 L: 5/5  Knee flex: R: 5/5 L: 5/5  Dorsiflexion: R: 5/5 L: 5/5  Plantarflexion: R: 5/5 L: 5/5    Cervical spine:   Flex:  30 degrees, pain free   Ext: 30 degrees, pain free   Rotation to right: 80 degrees, pain free   Rotation to left: 80 degrees, pain free   Tenderness in the cervical spine at midline. No   Tenderness in the cervical paraspinal muscles. No  Thoracic spine:    Tenderness in the thoracic spine at midline. No   Tenderness in the thoracic paraspinal muscles. No  Lumbar/Sacral spine:   Flexion:  90 degrees, pain free   Ext: 35 degrees, painful    Rotation/ext to right: painful    Rotation/ext to left: painful    Tenderness in the lumbar spine at midline. Yes   Tenderness in the lumbar paraspinal muscles. Yes   Tenderness over SI joint:      Right: negative     Left:  negative   Tenderness over piriformis:     Right: negative    Left:  negative   Tenderness over Trochanteric Bursa:     Right: negative    Left: negative      Gait pattern:  Able to walk on the heels and toes. Patient has a normal gait.     Neurological:   Deep Tendon Reflex exam:   Biceps:     R:  2/4   L: 2/4   Brachioradialis   R:  2/4   L: 2/4:   Patella:  R:  2/4   L: 2/4   Achilles:  R:  2/4   L: 2/4    Sensory exam:   Light touch: normal bilateral upper and lower extremities Vibration: normal in bilateral upper extremities and bilateral lower extremities   Sharp: normal in bilateral upper extremities and bilateral lower extremities   No allodynia, dysesthesia, or hyperalgesia.      Previous Diagnostic Tests:   Imaging Studies:     Xray right knee 10/05/2015  IMPRESSION:  1. No acute osseous injuries are noted.   2. Correlation with physical exam recommended to exclude an ACL injury.    MRI right knee  1/4/2016  Impression:  No internal derangement identified. There is a stated history of previous knee joint surgery but no significant of meniscal loss is noted to suggest definite findings for partial meniscectomy.      [place] Board of Pharmacy Data Base Reviewed:    YES; No concern for abuse or misuse of controlled medications based on this report.       ASSESSMENT:   Helena Berrios is a 38 year old female who presents today with the complaints of:    1. Chronic pain of both knees  2. Chronic low back pain  3. Insomnia    We discussed a multidisciplinary approach to pain management today.  This included physical therapy, behavioral health, medication management, and injection therapy.  We talked about the difference between opiate and nonopiate pain medications.  We discussed the risks associated with opiate pain medications including tolerance, physical dependency, and addiction.  We talked about the different types of nonopiate pain medications which include anti-inflammatories, antidepressants, muscle relaxants, topical agents, and neuropathic pain medications.    We discussed a genicular nerve block preceding to a RFA. She would prefer to wait on this for the time. We discussed that she shouldn't be taking 2 different antiinflammatories at the same time. We discussed increasing her Celebrex from 100mg at bedtime to 200mg at bedtime and stopping the meloxicam. She would like to do this. We also discussed a topical agent as well.     PLAN:    Diagnosis reviewed, treatment option addressed, and risk/benefits discussed.  Self-care instructions given.  I am recommending a multidisciplinary treatment plan to help this patient better manage her pain.       1. Physical Therapy:  Not at this time  2. Clinical Health Psychologist to address issues of relaxation, behavorial change, coping style, and other factors important to improvement: not at this time  3. Diagnostic Studies:  None at this time  4. Medication  Management:   1. Stop the Meloxicam  2. Celebrex 200mg at bedtime with food  3. Voltaren Gel 1%: apply 2g to knees 4 times a day  5. Potential procedures: consider genicular nerve block preceding to RFA  6. Other treatments: referred for a sleep study    7. Recommendations to PCP: see above    Follow up: 6-8 weeks     TIME SPENT:   A total of 30  minutes was spent on the patient today, greater than 50% of that time was spent on face to face counseling and care coordination regarding diagnoses and treatment options as mentioned above.    I would like to thank Evelyn Cazares APRN, CNP for allowing me to participate in the management of this patient.     Tika Knowles PA-C  [facility]

## 2019-07-31 NOTE — PROGRESS NOTES
Visit #:  2 of 8 based on treatment plan 7/26/2019    Subjective:  Helena Berrios is a 38 year old female who is seen in f/u up for:        Segmental dysfunction of sacral region  Segmental dysfunction of lumbar region  Segmental dysfunction of thoracic region  Segmental dysfunction of lower extremity  Chronic bilateral low back pain without sciatica.     Since last visit on 7/26/2019,  Helena Berrios reports the following changes: Patient presents and states that she was sore after the adjustment, but then she felt better for a couple days; however, her knees are still sore. She attributes her knee pain to being overweight. She has tingling in her right UE, the side she had shoulder surgery, 3-5th digits. She went horseback riding last week, and thinks that contributed to her return of pain, as she slept in a camper. Patient is scheduled to see Tika Knowles in pain management tomorrow for her bilateral knee pain.        Objective:  The following was observed:    P: palpatory tenderness bilateral knees    A: static palpation demonstrates intersegmental asymmetry     R: motion palpation notes restricted motion    T: localized muscle spasm at: Lumbar erector spine Bilaterally      Assessment:    Segmental spinal dysfunction/restrictions found at:  T1   T5  T10  L4  PSIS Left  Extra-spinal:    Diagnoses:      1. Segmental dysfunction of sacral region    2. Segmental dysfunction of lumbar region    3. Segmental dysfunction of thoracic region    4. Segmental dysfunction of lower extremity    5. Chronic bilateral low back pain without sciatica        Patient's condition:  Patient had restrictions pre-manipulation and Patient had decreased motion prior to manipulation    Treatment effectiveness:  Post manipulation there is better intersegmental movement and Patient claims to feel looser post manipulation      Procedures:  CMT:  24921 Chiropractic manipulative treatment 3-4 regions performed   51406 Chiropractic  manipulative treatment extraspinal dysfunction/restriction  Thoracic: Diversified, T1, T5, T10, Prone  Lumbar: Drop Table, L4, Prone  Pelvis: Drop Table, PSIS Left , Prone  Extra-spinal: Thuli Board, bilateral knees, Supine    Modalities:  34260: MSTM:  To Gluteal and Lumbar erector spine  for 5 min    Therapeutic procedures:  Gave patient Ice instructions post adjustment, and instructions for acute care      Prognosis: Good    Progress towards Goals:   Decrease pain in lower back and bilateral knees.  Support patient in returning to work and weight loss.      Response to Treatment:   Reduction of symptoms in her back, her knees are still sore.       Recommendations:    Instructions:ice 20 minutes every other hour as needed and stretch as instructed at visit    Follow-up:  Return to care next week. Patient states when she gets up after treatment that her knees feel much improved!

## 2019-08-01 ENCOUNTER — TELEPHONE (OUTPATIENT)
Dept: PALLIATIVE MEDICINE | Facility: OTHER | Age: 39
End: 2019-08-01

## 2019-08-01 NOTE — TELEPHONE ENCOUNTER
Prior Authorization Retail Medication Request    Medication/Dose: diclofenac (VOLTAREN) 1 % topical gel  ICD code (if different than what is on RX):    Previously Tried and Failed:    Rationale:      Insurance Name:  [place]   Insurance ID:  [id number]       Pharmacy Information (if different than what is on RX)    [facility] - [place] [address] [address] [facility] [address]  Phone: [phone] Fax: [phone]

## 2019-08-06 ENCOUNTER — MYC MEDICAL ADVICE (OUTPATIENT)
Dept: PALLIATIVE MEDICINE | Facility: CLINIC | Age: 39
End: 2019-08-06

## 2019-08-07 ENCOUNTER — ANCILLARY PROCEDURE (OUTPATIENT)
Dept: GENERAL RADIOLOGY | Facility: CLINIC | Age: 39
End: 2019-08-07
Attending: NURSE PRACTITIONER
Payer: COMMERCIAL

## 2019-08-07 ENCOUNTER — HOSPITAL ENCOUNTER (OUTPATIENT)
Dept: CT IMAGING | Facility: CLINIC | Age: 39
Discharge: HOME | End: 2019-08-07
Attending: NURSE PRACTITIONER | Admitting: NURSE PRACTITIONER
Payer: COMMERCIAL

## 2019-08-07 ENCOUNTER — OFFICE VISIT (OUTPATIENT)
Dept: FAMILY MEDICINE | Facility: CLINIC | Age: 39
End: 2019-08-07
Payer: COMMERCIAL

## 2019-08-07 ENCOUNTER — HOSPITAL ENCOUNTER (OUTPATIENT)
Dept: ULTRASOUND IMAGING | Facility: CLINIC | Age: 39
End: 2019-08-07
Attending: NURSE PRACTITIONER
Payer: COMMERCIAL

## 2019-08-07 VITALS
WEIGHT: 240 LBS | RESPIRATION RATE: 18 BRPM | BODY MASS INDEX: 38.74 KG/M2 | TEMPERATURE: 98.8 F | DIASTOLIC BLOOD PRESSURE: 84 MMHG | HEART RATE: 120 BPM | SYSTOLIC BLOOD PRESSURE: 130 MMHG
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
## 2019-08-07 DIAGNOSIS — M25.562 ACUTE PAIN OF LEFT KNEE: ICD-10-CM

## 2019-08-07 DIAGNOSIS — R91.8 PULMONARY NODULES: ICD-10-CM
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
## 2019-08-07 DIAGNOSIS — R60.0 BILATERAL LOWER EXTREMITY EDEMA: ICD-10-CM

## 2019-08-07 DIAGNOSIS — S80.02XA CONTUSION OF LEFT KNEE, INITIAL ENCOUNTER: ICD-10-CM

## 2019-08-07 PROCEDURE — 93924 LWR XTR VASC STDY BILAT: CPT

## 2019-08-07 PROCEDURE — 99213 OFFICE O/P EST LOW 20 MIN: CPT | Performed by: NURSE PRACTITIONER

## 2019-08-07 PROCEDURE — 73560 X-RAY EXAM OF KNEE 1 OR 2: CPT | Mod: LT

## 2019-08-07 PROCEDURE — 71250 CT THORAX DX C-: CPT

## 2019-08-07 RX ORDER — CYCLOBENZAPRINE HCL 10 MG
5-10 TABLET ORAL 3 TIMES DAILY PRN
Qty: 45 TABLET | Refills: 0 | Status: SHIPPED | OUTPATIENT
Start: 2019-08-07 | End: 2019-09-03

## 2019-08-07 ASSESSMENT — PAIN SCALES - GENERAL: PAINLEVEL: WORST PAIN (10)

## 2019-08-07 NOTE — PROGRESS NOTES
SUBJECTIVE   Helena Berrios is a  female who presents to clinic today for the following health issue(s):       Musculoskeletal problem/pain      Duration: Yesterday     Description  Location: Left knee-Patient states that yesterday her knee gave out and she fell out of a camper     Intensity:  moderate, severe    Accompanying signs and symptoms: none    History  Previous similar problem: YES  Previous evaluation:  x-ray and MRI    Precipitating or alleviating factors:  Trauma or overuse: YES  Aggravating factors include: walking    Therapies tried and outcome: nothing    Fell on top of knee onto gravel from camper stair   Can walk on - semi-pressure  Cannot bend - hurts too bad     Taking 1600 mg of ibuprofen at a time every 5 hours - twice yesterday   Iced for about 15 minutes yesterday - none since.   Taking Flexeril at bedtime       PCP   Physician No Ref-Primary None    Health Maintenance        Health Maintenance Due   Topic Date Due     PREVENTIVE CARE VISIT  1980     HIV SCREENING  11/30/1995       HPI        Patient Active Problem List   Diagnosis     Anxiety     Chronic pain of both knees     Essential hypertension     Backache     Multiple lung nodules on CT     Sleep disturbance     Obesity (BMI 35.0-39.9) with comorbidity (H)     Segmental dysfunction of sacral region     Segmental dysfunction of lumbar region     Segmental dysfunction of thoracic region     Segmental dysfunction of lower extremity     Lumbago     Current Outpatient Medications   Medication     amitriptyline (ELAVIL) 50 MG tablet     atenolol (TENORMIN) 100 MG tablet     celecoxib (CELEBREX) 200 MG capsule     chlorthalidone (HYGROTON) 25 MG tablet     cyclobenzaprine (FLEXERIL) 10 MG tablet     cyclobenzaprine (FLEXERIL) 10 MG tablet     diclofenac (VOLTAREN) 1 % topical gel     DULoxetine (CYMBALTA) 60 MG capsule     FLOVENT  MCG/ACT inhaler     furosemide (LASIX) 20 MG tablet     hydrOXYzine (ATARAX) 10 MG tablet      levonorgestrel (MIRENA, 52 MG,) 20 MCG/24HR IUD     mirtazapine (REMERON) 30 MG tablet     omeprazole (PRILOSEC) 40 MG DR capsule     ondansetron (ZOFRAN-ODT) 4 MG ODT tab     order for DME     order for DME     potassium chloride ER (K-DUR/KLOR-CON M) 20 MEQ CR tablet     ranitidine (ZANTAC) 300 MG tablet     Sharps Container (SHARPS-A-GATOR LOCKING BRACKET) MISC     SUMAtriptan (IMITREX) 100 MG tablet     VENTOLIN  (90 Base) MCG/ACT inhaler     zolpidem (AMBIEN) 5 MG tablet     No current facility-administered medications for this visit.        Reviewed and updated as needed this visit by Provider:  Tobacco  Allergies  Meds  Med Hx  Surg Hx  Fam Hx  Soc Hx     ROS:  Constitutional, HEENT, cardiovascular, pulmonary, gi and gu systems are negative, except as otherwise noted.    PHYSICAL EXAM   /84   Pulse 120   Temp 98.8  F (37.1  C) (Tympanic)   Resp 18   Wt 108.9 kg (240 lb)   BMI 38.74 kg/m    Body mass index is 38.74 kg/m .  GENERAL APPEARANCE: healthy, alert and mild distress  MS: extremities normal- no gross deformities noted, peripheral pulses normal and decreased range of motion of left knee  ORTHO: Knee Exam: Inspection: AP/lateral alignment normal, No effusion with patient at full extension without support of leg with vigorous shaking   Tender: generalized patellar   Non-tender: remainder of knee non-tender  Active Range of Motion: limited due to pain   Strength: unable to test due to pain       Diagnostic Test Results:  Xray - left knee, independently reviewed by ESTEBAN Giraldo CNP - not noting any acute fracture or misalignment and no effusions - will follow final radiologist read.     ASSESSMENT & PLAN   Assessment & Plan     1. Fall, initial encounter  Patient reports locking of knee and falling directly on top of knee from steps of camper. Reports significant pain and shaking of leg since. Has had leg straight since injury and has iced once. Is serafin to bear weight and took  "muscle relaxant at bedtime. Xray of knee independently reviewed by ESTEBAN Giraldo CNP not noting any acute abnormalities, will await final radiologist read. Advised patient on RICE therapy. Encouraged to start moving as tolerated. Also advised to schedule with pain management for injection as they discussed previously given worsening of knee.   - XR Knee Standing Left 2 Views; Future    2. Acute pain of left knee    - XR Knee Standing Left 2 Views; Future  - cyclobenzaprine (FLEXERIL) 10 MG tablet; Take 0.5-1 tablets (5-10 mg) by mouth 3 times daily as needed for muscle spasms  Dispense: 45 tablet; Refill: 0    3. Contusion of left knee, initial encounter  See above. RICE therapy as above.        BMI:   Estimated body mass index is 38.74 kg/m  as calculated from the following:    Height as of 7/31/19: 1.676 m (5' 6\").    Weight as of this encounter: 108.9 kg (240 lb).   Weight management plan: Discussed healthy diet and exercise guidelines        Patient Instructions   Not seeing any fracture or abnormality on xray - will update you if radiologist reads any different     Should get you scheduled for an MRI of your knee given the severity of pain. Call [phone] to schedule     Likely you contused and muscles and tendons around are having spasms    Would like you to continue current medication regimen with a change to taking Flexeril 10 mg three times daily - do not take while driving     Ice knee 20 minutes on/ 20 minutes off FREQUENTLY through out the day     Keep elevated    Should use ace wrap or compression sleeve around knee for compression/comfort      DO NOT take Ibuprofen     Can take 1,000 mg of Tylenol every 6 hours as needed - may want to stay on top of over the next few days     Schedule recheck then with pain management to discuss MRI results and further management          Return in about 2 weeks (around 8/21/2019), or if symptoms worsen or fail to improve.    ESTEBAN Giraldo " [facility]      Risks, benefits, side effects and rationale for treatment plan fully discussed with the patient and understanding expressed.

## 2019-08-07 NOTE — PATIENT INSTRUCTIONS
Not seeing any fracture or abnormality on xray - will update you if radiologist reads any different     Should get you scheduled for an MRI of your knee given the severity of pain. Call [phone] to schedule     Likely you contused and muscles and tendons around are having spasms    Would like you to continue current medication regimen with a change to taking Flexeril 10 mg three times daily - do not take while driving     Ice knee 20 minutes on/ 20 minutes off FREQUENTLY through out the day     Keep elevated    Should use ace wrap or compression sleeve around knee for compression/comfort      DO NOT take Ibuprofen     Can take 1,000 mg of Tylenol every 6 hours as needed - may want to stay on top of over the next few days     Schedule recheck then with pain management to discuss MRI results and further management

## 2019-08-08 ENCOUNTER — HOSPITAL ENCOUNTER (OUTPATIENT)
Dept: CARDIOLOGY | Facility: CLINIC | Age: 39
Discharge: HOME | End: 2019-08-08
Attending: NURSE PRACTITIONER | Admitting: NURSE PRACTITIONER
Payer: COMMERCIAL

## 2019-08-08 DIAGNOSIS — R06.00 PND (PAROXYSMAL NOCTURNAL DYSPNEA): ICD-10-CM

## 2019-08-08 DIAGNOSIS — R60.0 BILATERAL LOWER EXTREMITY EDEMA: ICD-10-CM

## 2019-08-08 PROCEDURE — 93306 TTE W/DOPPLER COMPLETE: CPT

## 2019-08-08 PROCEDURE — 93306 TTE W/DOPPLER COMPLETE: CPT | Mod: 26 | Performed by: INTERNAL MEDICINE

## 2019-08-10 NOTE — TELEPHONE ENCOUNTER
PA Initiation    Medication: diclofenac (VOLTAREN) 1 % topical gel  Insurance Company: [place] - Phone [phone] Fax [phone]  Pharmacy Filling the Rx: [facility] [address] [place] [address]  Filling Pharmacy Phone: [phone]  Filling Pharmacy Fax:    Start Date: 8/10/2019    Central Prior Authorization Team   Phone: [phone]

## 2019-08-13 ENCOUNTER — MYC MEDICAL ADVICE (OUTPATIENT)
Dept: PALLIATIVE MEDICINE | Facility: CLINIC | Age: 39
End: 2019-08-13

## 2019-08-13 NOTE — TELEPHONE ENCOUNTER
Prior Authorization Approval    Authorization Effective Date: 7/11/2019  Authorization Expiration Date: 8/9/2020  Medication: diclofenac (VOLTAREN) 1 % topical gel  Approved Dose/Quantity: 100g  Reference #: [id number]   Insurance Company: [employer] - Phone [phone] Fax [phone]  Which Pharmacy is filling the prescription (Not needed for infusion/clinic administered): [facility] [address] [place] [address]  Pharmacy Notified: Yes  Patient Notified: **Instructed pharmacy to notify patient when script is ready to /ship.**

## 2019-08-27 ENCOUNTER — MYC MEDICAL ADVICE (OUTPATIENT)
Dept: PALLIATIVE MEDICINE | Facility: CLINIC | Age: 39
End: 2019-08-27

## 2019-08-27 DIAGNOSIS — G89.29 BILATERAL CHRONIC KNEE PAIN: Primary | ICD-10-CM

## 2019-08-27 DIAGNOSIS — M25.561 BILATERAL CHRONIC KNEE PAIN: Primary | ICD-10-CM

## 2019-08-27 DIAGNOSIS — M25.562 BILATERAL CHRONIC KNEE PAIN: Primary | ICD-10-CM

## 2019-08-28 NOTE — TELEPHONE ENCOUNTER
From: Helena Berrios      Created: 8/27/2019 11:20 AM        *-*-*This message has not been handled.*-*-*    So the gel is somewhat working. I am using it like it says but by morning i am back to where i started. I am scared to do that procedure from my father having it done and it not working. Do i have anymore options.        Will forward to Tika to review and advise     Tyler Hale, RN  Care Coordinator   [facility] [place]

## 2019-09-03 ENCOUNTER — MYC MEDICAL ADVICE (OUTPATIENT)
Dept: FAMILY MEDICINE | Facility: CLINIC | Age: 39
End: 2019-09-03

## 2019-09-03 DIAGNOSIS — M54.5 CHRONIC BILATERAL LOW BACK PAIN, WITH SCIATICA PRESENCE UNSPECIFIED: Primary | ICD-10-CM

## 2019-09-03 DIAGNOSIS — M25.562 ACUTE PAIN OF LEFT KNEE: ICD-10-CM

## 2019-09-03 DIAGNOSIS — G47.9 SLEEP DISTURBANCE: ICD-10-CM

## 2019-09-03 DIAGNOSIS — G89.29 CHRONIC BILATERAL LOW BACK PAIN, WITH SCIATICA PRESENCE UNSPECIFIED: Primary | ICD-10-CM

## 2019-09-03 RX ORDER — CYCLOBENZAPRINE HCL 10 MG
5-10 TABLET ORAL 3 TIMES DAILY PRN
Qty: 45 TABLET | Refills: 0 | Status: SHIPPED | OUTPATIENT
Start: 2019-09-03 | End: 2019-09-16

## 2019-09-03 RX ORDER — ZOLPIDEM TARTRATE 5 MG/1
5 TABLET ORAL
Qty: 15 TABLET | Refills: 5 | Status: SHIPPED | OUTPATIENT
Start: 2019-09-03 | End: 2020-03-20

## 2019-09-08 ENCOUNTER — MYC MEDICAL ADVICE (OUTPATIENT)
Dept: PALLIATIVE MEDICINE | Facility: CLINIC | Age: 39
End: 2019-09-08

## 2019-09-09 NOTE — TELEPHONE ENCOUNTER
Will route to Provider to review.  -------------------  PLAN from 7/31/19:     Diagnosis reviewed, treatment option addressed, and risk/benefits discussed.  Self-care instructions given.  I am recommending a multidisciplinary treatment plan to help this patient better manage her pain.                  1. Physical Therapy:  Not at this time  2. Clinical Health Psychologist to address issues of relaxation, behavorial change, coping style, and other factors important to improvement: not at this time  3. Diagnostic Studies:  None at this time  4. Medication Management:   1. Stop the Meloxicam  2. Celebrex 200mg at bedtime with food  3. Voltaren Gel 1%: apply 2g to knees 4 times a day  5. Potential procedures: consider genicular nerve block preceding to RFA  6. Other treatments: referred for a sleep study    7. Recommendations to PCP: see above     Follow up: 6-8 weeks

## 2019-09-16 ENCOUNTER — MYC REFILL (OUTPATIENT)
Dept: FAMILY MEDICINE | Facility: CLINIC | Age: 39
End: 2019-09-16

## 2019-09-16 ENCOUNTER — MYC REFILL (OUTPATIENT)
Dept: PALLIATIVE MEDICINE | Facility: CLINIC | Age: 39
End: 2019-09-16

## 2019-09-16 DIAGNOSIS — G89.29 CHRONIC BILATERAL LOW BACK PAIN, WITH SCIATICA PRESENCE UNSPECIFIED: ICD-10-CM

## 2019-09-16 DIAGNOSIS — G89.29 CHRONIC PAIN OF BOTH KNEES: ICD-10-CM

## 2019-09-16 DIAGNOSIS — M25.562 CHRONIC PAIN OF BOTH KNEES: ICD-10-CM

## 2019-09-16 DIAGNOSIS — M54.5 CHRONIC BILATERAL LOW BACK PAIN, WITH SCIATICA PRESENCE UNSPECIFIED: ICD-10-CM

## 2019-09-16 DIAGNOSIS — M25.561 CHRONIC PAIN OF BOTH KNEES: ICD-10-CM

## 2019-09-16 RX ORDER — CYCLOBENZAPRINE HCL 10 MG
TABLET ORAL
Qty: 45 TABLET | Refills: 0 | OUTPATIENT
Start: 2019-09-16

## 2019-09-16 NOTE — TELEPHONE ENCOUNTER
Samantha received from [facility] requesting a refill of diclofenac (VOLTAREN) 1 % topical gel on behalf of Helena Berrios.  Last refill: 7/31/19  # 100 g with 1 refills at [facility].  Last office visit:  8/1/19  Next office visit:  None Scheduled    This is an appropriate refill, and has been e-prescribed. Amanda Puri, CMA

## 2019-09-17 RX ORDER — CYCLOBENZAPRINE HCL 10 MG
5-10 TABLET ORAL 3 TIMES DAILY PRN
Qty: 90 TABLET | Refills: 0 | Status: SHIPPED | OUTPATIENT
Start: 2019-09-17 | End: 2020-02-25

## 2019-09-17 NOTE — TELEPHONE ENCOUNTER
Please notify patient of refill of Flexeril, however a refill for 45 just went through on 9/3/2019. I would recommend she try to use this sparingly.     Thanks,  ESTEBAN Giraldo CNP

## 2019-09-17 NOTE — TELEPHONE ENCOUNTER
Requested Prescriptions   Pending Prescriptions Disp Refills     cyclobenzaprine (FLEXERIL) 10 MG tablet 45 tablet 0     Sig: Take 0.5-1 tablets (5-10 mg) by mouth 3 times daily as needed for muscle spasms       There is no refill protocol information for this order        Last Written Prescription Date:  9/3/19  Last Fill Quantity: 45,  # refills: 0   Last office visit: 8/7/2019 with prescribing provider:     Future Office Visit:      Routing refill request to provider for review/approval because:  Drug not on the G, P or [facility] refill protocol or controlled substance

## 2019-10-17 ENCOUNTER — TELEPHONE (OUTPATIENT)
Dept: PALLIATIVE MEDICINE | Facility: CLINIC | Age: 39
End: 2019-10-17

## 2019-10-17 NOTE — TELEPHONE ENCOUNTER
Rubina appointment requesting an appointment/change to [place] location. Patient is seen by Tika Knowles in [place].       Comments:   I am looking for a recommendation for the [place] office to continue my pain management  the sooner I can get in the better         Routing for review.       Ludivina Mukherjee    [facility]

## 2019-10-18 NOTE — NURSING NOTE
"Chief Complaint   Patient presents with     Knee Pain       Initial BP (!) 140/92 (BP Location: Right arm, Patient Position: Sitting, Cuff Size: Adult Large)   Pulse 120   Temp 98.8  F (37.1  C) (Tympanic)   Resp 18   Wt 108.9 kg (240 lb)   BMI 38.74 kg/m   Estimated body mass index is 38.74 kg/m  as calculated from the following:    Height as of 7/31/19: 1.676 m (5' 6\").    Weight as of this encounter: 108.9 kg (240 lb).    Patient presents to the clinic using No DME    Health Maintenance that is potentially due pending provider review:  NONE    n/a    Is there anyone who you would like to be able to receive your results? No  If yes have patient fill out DANG    Sybil Murillo CMA    "
English

## 2019-10-18 NOTE — TELEPHONE ENCOUNTER
Discussed with Tika.  Patient seen for knee issues, thinks she may have moved.  Does feel ongoing pain management is appropriate.  Ok to transfer- please let me know who, as I will route a note to them.    Sybil Chapman MD  [facility]

## 2019-11-25 DIAGNOSIS — M25.562 CHRONIC PAIN OF BOTH KNEES: ICD-10-CM

## 2019-11-25 DIAGNOSIS — G89.29 CHRONIC PAIN OF BOTH KNEES: ICD-10-CM

## 2019-11-25 DIAGNOSIS — M25.561 CHRONIC PAIN OF BOTH KNEES: ICD-10-CM

## 2019-11-25 NOTE — TELEPHONE ENCOUNTER
Received fax request from   [id number] - [place], [place] - [address] [place] [address]  Phone: [phone] Fax: [phone]    pharmacy requesting refill(s) for Voltaren 1%    Last refilled on 07/31/19    Pt last seen on 07/31/19  Next appt scheduled for NONE    Will facilitate refill.    Alka Alvarado, [facility]  November 25, 2019

## 2019-11-26 NOTE — TELEPHONE ENCOUNTER
Okay for 1 refill. Patient will need follow up visit prior to more refills. There was a request for transfer to [place] location. This was approved, therefore, follow up can be with me or with provider in [place]. She can call [phone] to schedule with a provider in [place] if she decides to.    Tika Knowles PA-C on 11/26/2019 at 8:24 AM

## 2020-01-27 NOTE — PROGRESS NOTES
SUBJECTIVE   Helena Berrios is a  female who presents to clinic today for the following health issue(s):       Anxiety Follow-Up  Living up in [place] for 2 more months, go back to [place] and sees someone at [facility]. Patient is up here taking care of dad, so has established care both in [place] which is where her primary is and here when she is up this way.     How are you doing with your anxiety since your last visit? Up and down, wants to adjust her anxiety medications feels like she has been on them too long and they arent working      Are you having other symptoms that might be associated with anxiety? Yes:  OCD, sleep issues, only sleeps 3 hours a day, feeling overwhelmed     Have you had a significant life event? OTHER: dad has been sick, taking care of grandma     Are you feeling depressed? No    Do you have any concerns with your use of alcohol or other drugs? No    Has multiple things to help her sleep including tried yoga, meditation   Takes 2-3 hours to fall asleep, tosses and turns  Has tea before bed   Constant thoughts - racing  Takes hydroxyzine 40 mg at bedtime - not really helping   Follow with primary care provider for this.     Has been on the Cymbalta and Amitriptyline for approximately 2 years   Currently on 90 mg - 60 +30 - just increased in the last couple of months.   Has never been on any other medications for anxiety   No depression     Blood pressure  Started Lisinopril approximately 2 months ago - tolerating well   Last A1c 7.1  Currently not on anything for her diabetes - is following with primary for this.   Met with diabetic educator in [place]       Social History     Tobacco Use     Smoking status: Never Smoker     Smokeless tobacco: Never Used   Substance Use Topics     Alcohol use: Never     Frequency: Never     Drug use: Never     PHQ-2 Score:     PHQ-2 ( 1999 Pfizer) 1/28/2020 7/8/2019   Q1: Little interest or pleasure in doing things 2 0   Q2: Feeling down, depressed or  hopeless 0 0   PHQ-2 Score 2 0   Q1: Little interest or pleasure in doing things More than half the days -   Q2: Feeling down, depressed or hopeless Not at all -   PHQ-2 Score 2 -     MEGHA-7 SCORE 1/28/2020   Total Score 15 (severe anxiety)   Total Score 15       MEGHA-7   [employer], 2002; Used with Permission) 1/28/2020   1. Feeling nervous, anxious, or on edge Nearly every day   2. Not being able to stop or control worrying More than half the days   3. Worrying too much about different things Nearly every day   4. Trouble relaxing Nearly every day   5. Being so restless that it is hard to sit still More than half the days   6. Becoming easily annoyed or irritable More than half the days   7. Feeling afraid, as if something awful might happen Not at all   MEGHA 7 TOTAL SCORE 15 (severe anxiety)     No flowsheet data found.  MEGHA-7  1/28/2020   1. Feeling nervous, anxious, or on edge 3   2. Not being able to stop or control worrying 2   3. Worrying too much about different things 3   4. Trouble relaxing 3   5. Being so restless that it is hard to sit still 2   6. Becoming easily annoyed or irritable 2   7. Feeling afraid, as if something awful might happen 0   MEGHA-7 Total Score 15         How many servings of fruits and vegetables do you eat daily?  2-3    On average, how many sweetened beverages do you drink each day (Examples: soda, juice, sweet tea, etc.  Do NOT count diet or artificially sweetened beverages)?   0    How many days per week do you exercise enough to make your heart beat faster? 6, does a lot of walking at work but currently walks with her horses     How many minutes a day do you exercise enough to make your heart beat faster? 60 or more    How many days per week do you miss taking your medication? 0      PCP   Physician No Ref-Primary None    Health Maintenance        Health Maintenance Due   Topic Date Due     PREVENTIVE CARE VISIT  1980     HIV SCREENING  11/30/1995       HPI        Patient  Active Problem List   Diagnosis     Anxiety     Chronic pain of both knees     Essential hypertension     Backache     Multiple lung nodules on CT     Sleep disturbance     Obesity (BMI 35.0-39.9) with comorbidity (H)     Segmental dysfunction of sacral region     Segmental dysfunction of lumbar region     Segmental dysfunction of thoracic region     Segmental dysfunction of lower extremity     Lumbago     Current Outpatient Medications   Medication     amitriptyline (ELAVIL) 100 MG tablet     aspirin (ASA) 81 MG chewable tablet     celecoxib (CELEBREX) 200 MG capsule     chlorthalidone (HYGROTON) 25 MG tablet     CONTOUR NEXT TEST test strip     cyclobenzaprine (FLEXERIL) 10 MG tablet     DULoxetine (CYMBALTA) 60 MG capsule     FLOVENT  MCG/ACT inhaler     hydrOXYzine (ATARAX) 10 MG tablet     levonorgestrel (MIRENA, 52 MG,) 20 MCG/24HR IUD     lisinopril (PRINIVIL/ZESTRIL) 20 MG tablet     meloxicam (MOBIC) 15 MG tablet     mirtazapine (REMERON) 30 MG tablet     omeprazole (PRILOSEC) 40 MG DR capsule     SUMAtriptan (IMITREX) 100 MG tablet     VENTOLIN  (90 Base) MCG/ACT inhaler     zolpidem (AMBIEN) 5 MG tablet     atenolol (TENORMIN) 100 MG tablet     diclofenac (VOLTAREN) 1 % topical gel     diclofenac (VOLTAREN) 50 MG EC tablet     furosemide (LASIX) 20 MG tablet     ondansetron (ZOFRAN-ODT) 4 MG ODT tab     potassium chloride ER (K-DUR/KLOR-CON M) 20 MEQ CR tablet     Sharps Container (SHARPS-A-GATOR LOCKING BRACKET) MISC     No current facility-administered medications for this visit.        Reviewed and updated as needed this visit by Provider:  Tobacco  Allergies  Meds  Med Hx  Surg Hx  Fam Hx  Soc Hx     ROS:  Constitutional, neuro, ENT, endocrine, pulmonary, cardiac, gastrointestinal, genitourinary, musculoskeletal, integument and psychiatric systems are negative, except as otherwise noted.    PHYSICAL EXAM   BP (!) 132/92   Pulse 101   Temp 98.9  F (37.2  C) (Tympanic)   Resp 16   " Ht 1.66 m (5' 5.35\")   Wt 105.7 kg (233 lb)   SpO2 99%   BMI 38.35 kg/m    Body mass index is 38.35 kg/m .  GENERAL APPEARANCE: healthy, alert and no distress  EYES: Eyes grossly normal to inspection, PERRL and conjunctivae and sclerae normal  HENT: ear canals and TM's normal and nose and mouth without ulcers or lesions  NECK: no adenopathy, no asymmetry, masses, or scars and thyroid normal to palpation  RESP: lungs clear to auscultation - no rales, rhonchi or wheezes  CV: regular rates and rhythm, normal S1 S2, no S3 or S4 and no murmur, click or rub  ABDOMEN: soft, nontender, without hepatosplenomegaly or masses and bowel sounds normal  MS: extremities normal- no gross deformities noted  SKIN: no suspicious lesions or rashes  NEURO: Normal strength and tone, mentation intact and speech normal  PSYCH: mentation appears normal, affect normal/bright and anxious      Diagnostic Test Results:  none     ASSESSMENT & PLAN   Risks, benefits, side effects and rationale for treatment plan fully discussed with the patient and understanding expressed.    Assessment & Plan     1. Anxiety  Chronic, follows with primary care provider in [place]. Is currently up taking care of dad near [place]. Recently had Cymbalta increased with not much improvement. Anxiety is bad, however has a lot of stressors in life currently. Isn't sleeping well at all. Did recently have sleep study as well and is now using a CPAP. Has difficulty shutting brain down. Has tried some self cares without improvement. Ultimately advised patient to follow with her primary care provider for chronic concerns, however a she is up this way will make some changes. Discussed increasing Cymbalta to 120 mg (max), discussed if this isn't helpful would have her primary discuss a change of medication with her or see psychology. For sleep and anxiety will also increase her Amitriptyline, advised can start wit 1 1/2 (75 mg) and go up to 100 mg if needed for sleep. " "Good sleep hygiene also discussed. Patient to follow up with primary care provider or myself in 1 month to see how things are going.   - amitriptyline (ELAVIL) 100 MG tablet; Take 1 tablet (100 mg) by mouth At Bedtime  Dispense: 90 tablet; Refill: 0  - DULoxetine (CYMBALTA) 60 MG capsule; Take 2 capsules (120 mg) by mouth daily  Dispense: 62 capsule; Refill: 0    2. Essential hypertension  Blood pressure still elevated after a few checks. Has been working with primary care provider on this and recently started Lisinopril. Patient has been tolerating well. Due to still elevated blood pressure's will increase to 20 mg and again have patient follow up with primary care provider or if still here with me.   - lisinopril (PRINIVIL/ZESTRIL) 20 MG tablet; Take 1 tablet (20 mg) by mouth daily  Dispense: 90 tablet; Refill: 0  - aspirin (ASA) 81 MG chewable tablet; Take 1 tablet (81 mg) by mouth daily    3. Sleep disturbance  See above.        BMI:   Estimated body mass index is 38.35 kg/m  as calculated from the following:    Height as of this encounter: 1.66 m (5' 5.35\").    Weight as of this encounter: 105.7 kg (233 lb).   Weight management plan: Not discussed at this visit.        Patient Instructions   Will increase Cymbalta to 120 mg daily     Increase your Amitriptyline to 100 mg at bedtime - may try 1 1/2 tab (75mg) and see how you do with that. Can stay at that dose or the 100 mg and just let me know.     Increase Lisinopril to 20 mg daily for blood pressure     Would encourage you to start a baby Asprin daily     Get in touch with your primary with updates and discussion/question on Metformin and statin?    Schedule a follow up with me in 1 month for follow up on medication adjustments or with primary care provider if in [place]    Probably schedule with her this spring for a diabetic visit and follow up.     Would like you to work on bedtime hygiene    - Shutting screens off 1 hour or 1/2 hour prior to bed  - " journal 3 positive things from your day  - meditation or yoga  - deep breathing exercises   - reading a light book   - sleepy time tea  - sleep only as much as you need to feel rested and then get out of bed  - keep a regular sleep schedule  - avoid forcing sleep  - avoid caffeinated beverages after lunch  - avoid alcohol near bedtime  - avoid smoking, especially in the evening  - do not go to bed hungry  - adjust bedroom environment          Return in about 1 month (around 2/28/2020) for Recheck.    ESTEBAN Renteria [facility]

## 2020-01-28 ENCOUNTER — OFFICE VISIT (OUTPATIENT)
Dept: FAMILY MEDICINE | Facility: CLINIC | Age: 40
End: 2020-01-28
Payer: COMMERCIAL

## 2020-01-28 VITALS
RESPIRATION RATE: 16 BRPM | TEMPERATURE: 98.9 F | DIASTOLIC BLOOD PRESSURE: 92 MMHG | HEIGHT: 65 IN | HEART RATE: 101 BPM | SYSTOLIC BLOOD PRESSURE: 132 MMHG | OXYGEN SATURATION: 99 % | BODY MASS INDEX: 38.82 KG/M2 | WEIGHT: 233 LBS

## 2020-01-28 DIAGNOSIS — F41.9 ANXIETY: Primary | ICD-10-CM

## 2020-01-28 DIAGNOSIS — I10 ESSENTIAL HYPERTENSION: ICD-10-CM

## 2020-01-28 DIAGNOSIS — G47.9 SLEEP DISTURBANCE: ICD-10-CM

## 2020-01-28 PROCEDURE — 99214 OFFICE O/P EST MOD 30 MIN: CPT | Performed by: NURSE PRACTITIONER

## 2020-01-28 RX ORDER — DULOXETIN HYDROCHLORIDE 60 MG/1
120 CAPSULE, DELAYED RELEASE ORAL DAILY
Qty: 62 CAPSULE | Refills: 0 | Status: SHIPPED | OUTPATIENT
Start: 2020-01-28 | End: 2020-03-20

## 2020-01-28 RX ORDER — AMITRIPTYLINE HYDROCHLORIDE 100 MG/1
100 TABLET ORAL AT BEDTIME
Qty: 90 TABLET | Refills: 0 | Status: SHIPPED | OUTPATIENT
Start: 2020-01-28 | End: 2020-03-20

## 2020-01-28 RX ORDER — ASPIRIN 81 MG/1
81 TABLET, CHEWABLE ORAL DAILY
COMMUNITY
Start: 2020-01-28

## 2020-01-28 RX ORDER — LISINOPRIL 10 MG/1
10 TABLET ORAL
COMMUNITY
Start: 2019-11-15 | End: 2020-01-28

## 2020-01-28 RX ORDER — MELOXICAM 15 MG/1
15 TABLET ORAL
COMMUNITY
Start: 2019-09-28 | End: 2020-02-21

## 2020-01-28 RX ORDER — LISINOPRIL 20 MG/1
20 TABLET ORAL DAILY
Qty: 90 TABLET | Refills: 0 | Status: SHIPPED | OUTPATIENT
Start: 2020-01-28

## 2020-01-28 ASSESSMENT — ANXIETY QUESTIONNAIRES
4. TROUBLE RELAXING: NEARLY EVERY DAY
GAD7 TOTAL SCORE: 15
7. FEELING AFRAID AS IF SOMETHING AWFUL MIGHT HAPPEN: NOT AT ALL
1. FEELING NERVOUS, ANXIOUS, OR ON EDGE: NEARLY EVERY DAY
7. FEELING AFRAID AS IF SOMETHING AWFUL MIGHT HAPPEN: NOT AT ALL
5. BEING SO RESTLESS THAT IT IS HARD TO SIT STILL: MORE THAN HALF THE DAYS
GAD7 TOTAL SCORE: 15
2. NOT BEING ABLE TO STOP OR CONTROL WORRYING: MORE THAN HALF THE DAYS
3. WORRYING TOO MUCH ABOUT DIFFERENT THINGS: NEARLY EVERY DAY
6. BECOMING EASILY ANNOYED OR IRRITABLE: MORE THAN HALF THE DAYS

## 2020-01-28 ASSESSMENT — MIFFLIN-ST. JEOR: SCORE: 1738.37

## 2020-01-28 NOTE — PATIENT INSTRUCTIONS
Will increase Cymbalta to 120 mg daily     Increase your Amitriptyline to 100 mg at bedtime - may try 1 1/2 tab (75mg) and see how you do with that. Can stay at that dose or the 100 mg and just let me know.     Increase Lisinopril to 20 mg daily for blood pressure     Would encourage you to start a baby Asprin daily     Get in touch with your primary with updates and discussion/question on Metformin and statin?    Schedule a follow up with me in 1 month for follow up on medication adjustments or with primary care provider if in [place]    Probably schedule with her this spring for a diabetic visit and follow up.     Would like you to work on bedtime hygiene    - Shutting screens off 1 hour or 1/2 hour prior to bed  - journal 3 positive things from your day  - meditation or yoga  - deep breathing exercises   - reading a light book   - sleepy time tea  - sleep only as much as you need to feel rested and then get out of bed  - keep a regular sleep schedule  - avoid forcing sleep  - avoid caffeinated beverages after lunch  - avoid alcohol near bedtime  - avoid smoking, especially in the evening  - do not go to bed hungry  - adjust bedroom environment

## 2020-01-29 ASSESSMENT — ANXIETY QUESTIONNAIRES: GAD7 TOTAL SCORE: 15

## 2020-02-18 ENCOUNTER — TELEPHONE (OUTPATIENT)
Dept: FAMILY MEDICINE | Facility: CLINIC | Age: 40
End: 2020-02-18

## 2020-02-18 NOTE — TELEPHONE ENCOUNTER
Thank you, information noted.      ESTEBAN Coello [facility] [place]

## 2020-02-18 NOTE — TELEPHONE ENCOUNTER
Panel Management Review      Patient has the following on her problem list:     Hypertension   Last three blood pressure readings:  BP Readings from Last 3 Encounters:   01/28/20 (!) 132/92   08/07/19 130/84   07/31/19 138/80     Blood pressure: FAILED    HTN Guidelines:  Less than 140/90      Composite cancer screening  Chart review shows that this patient is due/due soon for the following None  Summary:    Patient is due/failing the following:   BP CHECK    Action needed:   Patient needs nurse only appointment.    Type of outreach:    Sent [facility] message.    Questions for provider review:    None                                                                                                                                    Gloria OLIVA [facility]       Chart routed to Care Team .

## 2020-02-19 ENCOUNTER — MYC MEDICAL ADVICE (OUTPATIENT)
Dept: FAMILY MEDICINE | Facility: CLINIC | Age: 40
End: 2020-02-19

## 2020-02-19 ENCOUNTER — MYC REFILL (OUTPATIENT)
Dept: FAMILY MEDICINE | Facility: CLINIC | Age: 40
End: 2020-02-19

## 2020-02-19 DIAGNOSIS — F41.9 ANXIETY: ICD-10-CM

## 2020-02-19 DIAGNOSIS — G47.9 SLEEP DISTURBANCE: ICD-10-CM

## 2020-02-19 DIAGNOSIS — G89.29 CHRONIC BILATERAL LOW BACK PAIN, WITH SCIATICA PRESENCE UNSPECIFIED: ICD-10-CM

## 2020-02-19 DIAGNOSIS — M54.5 CHRONIC BILATERAL LOW BACK PAIN, WITH SCIATICA PRESENCE UNSPECIFIED: ICD-10-CM

## 2020-02-19 RX ORDER — AMITRIPTYLINE HYDROCHLORIDE 100 MG/1
100 TABLET ORAL AT BEDTIME
Qty: 90 TABLET | Refills: 0 | Status: CANCELLED | OUTPATIENT
Start: 2020-02-19

## 2020-02-19 RX ORDER — MIRTAZAPINE 30 MG/1
30 TABLET, FILM COATED ORAL AT BEDTIME
Qty: 90 TABLET | Refills: 1 | Status: CANCELLED | OUTPATIENT
Start: 2020-02-19

## 2020-02-19 NOTE — TELEPHONE ENCOUNTER
Marychuy,     Could I please have you clarify with patient what she is referring to for scans? Also, determine which medications we actually need filled, some of these I already filled here in [place].     Thanks,  ESTEBAN Giraldo CNP

## 2020-02-19 NOTE — TELEPHONE ENCOUNTER
Routing this my chart for Evelyn to review, do see the my chart refill from today.    AMADOU James

## 2020-02-20 NOTE — TELEPHONE ENCOUNTER
Pt should not need amitriptyline or mirtazapine refills.  Forwarded other refills to Diamond Toribio RN

## 2020-02-20 NOTE — PROGRESS NOTES
"[facility]    Date of visit: 2/20/2020     Helena Berrios is a 39 year old female who is seen today for transfer of care from [facility]ANT[facility]  for ongoing management of chronic bilateral knee pain. She was originally referred to [facility] in July of 2019.    Review of [place] Prescription Monitoring Program (): No concern for abuse or misuse of controlled medications based on this report.     Pain medications are being prescribed by NA.     Subjective:    Chief Complaint:    Chief Complaint   Patient presents with     Pain       Pain history:  Helena Berrios is a 39 year old female who presents for initial evaluation of chief complaint of bilateral knee and back pain.      She first started having problems with knee pain in [facility]. She attributes development of pain to be due to playing basketball and softball. She reports recurrent \"popping\" that was painful and bothersome. States she has been told she does not have arthritis, unclear of the cause of her pain. She was eventually referred to orthopedic surgery. She had bilateral knee arthroscopy with Dr. Peres with [facility] about 13 years ago. Does note improvement in pain and popping for about a year. She has tried bilateral synvisc and cortisone injections (with Dr. Peres) with 6 weeks of relief only. She has developed popping in her knees again over the last year. She sometimes wears knee braces without benefit. She has tried extensive medication management with unclear benefit. Has an appointment with psychology upcoming. Her pain has worsened over the last year with weight gain. The pain is located in bilateral knees.     She first started having problems with low back pain in 2019. She attributes the pain to be due weight gain, is working on weight loss. She has tried chiropractic care with some benefit, stopped a few months ago due to a move. She is restarting with Inez Burns DC. " Chiropractic care was not helpful for knee pain. She has tried acupuncture with benefit with back pain and headaches. She uses a TENS unit with short term benefit for back, not very helpful for knees anymore. The pain is located in bilateral low back, tender to touch. Denies radiation. Denies numbness or tingling. Denies weakness.     Pain description:  Location: back and knees  Quality: aching  Severity/Intensity: 5/10 at worst, 7/10 at best, 7/10 on average  Aggravating factors include: sitting, stairs, bending down  Relieving factors include: movement, walking    The patient otherwise denies bowel or bladder incontinence, parasthesias, weakness, saddle anesthesia, unintentional weight loss, or fever/chills/sweats.     Helena Berrios has been seen at a pain clinic in the past. [facility] x1 visit 2018 and [facility]ANT[facility].    Pain Treatments:  1. Medications:       Current pain medications:   Amitriptyline 100mg at bedtime- H for sleep   Celebrex 100mg at bedtime- ?   Flexeril 10mg-takes 3 tabs at bedtime- HI, [facility] for neck pain, has been taking this way for 4 years   Cymbalta 120mg daily- ?, recent increase to this dose, unsure of benefit   Atarax 10mg at bedtime prn anxiety- [facility]    Meloxicam 15mg at bedtime- ?   Imitrex-uses rarely- H for migraines   Ambien 5mg at bedtime at bedtime- SW, takes a few days a week   Remeron 30mg at bedtime- ? for sleep or anxiety   Voltaren gel prn- [facility] for shoulder pain, ? Knee pain    Current calculated MME: 0    1. Previous Pain Relevant Medications:  (H--helped; HI--Helped initially; [facility]--Somewhat helpful; NH--No help; W--worse; SE--side effects; ?--Unsure if helpful)   NOTE: This medication information taken from patient's intake form, not medical records.    Opiates: Percocet- hives   NSAIDS: Celebrex- NH, ibuprofen- NH, Aleve- GI, Mobic- ?/NH    Muscle Relaxants: Flexeril- H for neck/shoulder   Anti-migraine mediations: Imitrex- [facility], SE,  groggy   Anti-depressants: amitriptyline- NH, Cymbalta- NH, Seroquel- SE, restless legs   Sleep aids: Ambien- [facility], Remeron- [facility]    Anxiolytics: hydroxyzine- [facility]   Neuropathics: gabapentin (900mg TID)- [facility]    Topicals: lidocaine- [facility]    Other medications not covered above: Tylenol- [facility]    2. Physical Therapy: yes, aquatic therapy- [facility]  3. Pain Psychology: no  4. Surgery: bilateral knee arthroscopy 12 years ago  5. Injections: bilateral knee synvisc and cortisone Dr. Peres [facility]- [facility]  6. Chiropractic: yes- restarting Bev Burns DC- H for back  7. Acupuncture: yes- H for headaches and back pain  8. TENS Unit: yes- H while on    Imaging:  X-ray of bilateral knees was completed on 10/5/15 and shows:  FINDINGS:   Bones: Alignment is normal. No acute fractures or bone lesions identified. A deep lateral sulcus is present.    Joint spaces: Unremarkable. No knee effusion is seen on the lateral exam.     Soft tissues: Unremarkable. No radiopaque foreign bodies are seen.      IMPRESSION:  1. No acute osseous injuries are noted.   2. Correlation with physical exam recommended to exclude an ACL injury      MRI of left knee was completed on 3/25/11 and shows:  CONCLUSION:   1. Small knee effusion and minimal popliteal cyst.   2. Otherwise unremarkable MRI of the left knee.   3. No medial or lateral meniscus tears.   4. No ligament injuries.   5. No osteochondral abnormalities.     MRI of right knee was completed on 1/4/13 and shows:  Impression:  No internal derangement identified. There is a stated history of previous   knee joint surgery but no significant of meniscal loss is noted to suggest   definite findings for partial meniscectomy.    Past Medical History:  History reviewed. No pertinent past medical history.    Past Surgical History:  Past Surgical History:   Procedure Laterality Date     ANKLE SURGERY Left 1997, 2003     KNEE SURGERY Right 2007 x 2 surgeries     SHOULDER SURGERY Right 2012        Medications:  Current Outpatient Medications   Medication Sig Dispense Refill     amitriptyline (ELAVIL) 100 MG tablet Take 1 tablet (100 mg) by mouth At Bedtime 90 tablet 0     aspirin (ASA) 81 MG chewable tablet Take 1 tablet (81 mg) by mouth daily       atenolol (TENORMIN) 100 MG tablet Take 100 mg by mouth daily       celecoxib (CELEBREX) 200 MG capsule Take 1 capsule (200 mg) by mouth daily 30 capsule 2     chlorthalidone (HYGROTON) 25 MG tablet Take 25 mg by mouth daily       CONTOUR NEXT TEST test strip        cyclobenzaprine (FLEXERIL) 10 MG tablet Take 0.5-1 tablets (5-10 mg) by mouth 3 times daily as needed for muscle spasms 90 tablet 0     diclofenac (VOLTAREN) 1 % topical gel Place 2 g onto the skin 4 times daily 100 g 0     diclofenac (VOLTAREN) 50 MG EC tablet Take 1 tablet (50 mg) by mouth 3 times daily as needed for moderate pain . Take with food. 90 tablet 0     DULoxetine (CYMBALTA) 60 MG capsule Take 2 capsules (120 mg) by mouth daily 62 capsule 0     FLOVENT  MCG/ACT inhaler 1 puff daily  5     furosemide (LASIX) 20 MG tablet 1 tablet daily       hydrOXYzine (ATARAX) 10 MG tablet Take 10-40 mg by mouth At Bedtime       levonorgestrel (MIRENA, 52 MG,) 20 MCG/24HR IUD 1 Device by Intrauterine route       lisinopril (PRINIVIL/ZESTRIL) 20 MG tablet Take 1 tablet (20 mg) by mouth daily 90 tablet 0     meloxicam (MOBIC) 15 MG tablet Take 15 mg by mouth       mirtazapine (REMERON) 30 MG tablet TAKE 1 TABLET BY MOUTH AT BEDTIME 90 tablet 1     omeprazole (PRILOSEC) 40 MG DR capsule Take 40 mg by mouth daily       ondansetron (ZOFRAN-ODT) 4 MG ODT tab as needed       potassium chloride ER (K-DUR/KLOR-CON M) 20 MEQ CR tablet Take 20 mEq by mouth daily       Sharps Container (SHARPS-A-GATOR LOCKING BRACKET) MISC For personal use. Length: calf Strength: 20-30 mmHg       SUMAtriptan (IMITREX) 100 MG tablet as needed  5     VENTOLIN  (90 Base) MCG/ACT inhaler 1 puff daily  5     zolpidem  (AMBIEN) 5 MG tablet Take 1 tablet (5 mg) by mouth nightly as needed for sleep To last 30 days 15 tablet 5       Allergies:     Allergies   Allergen Reactions     Naproxen GI Disturbance and Hives     Oxycodone-Acetaminophen Hives     Prednisone Other (See Comments)     Sick to stomach  Vomiting     Quetiapine Other (See Comments)       Social History:  Home situation: lives in a [facility]  Support system:  and dad, children  Occupation/Schooling: not working  Tobacco use: 1 cigarette a day, quit but restarted  Drug use: no  Alcohol use: rarely   History of chemical dependency treatment: no  Mental health admissions: no    Family history:  Family History   Problem Relation Age of Onset     Cancer Mother      Diabetes Father      Heart Disease Father        Review of Systems:    POSTIVE IN BOLD  GENERAL: fever/chills, fatigue, general unwell feeling, weight gain/loss.  HEAD/EYES:  headache, dizziness, or vision changes.    EARS/NOSE/THROAT: nosebleeds, hearing loss, sinus infection, earache, tinnitus.  IMMUNE:  allergies, cancer, immune deficiency, or infections.  SKIN:  Itching, rash, hives  HEME/Lymphatic: anemia, easy bruising, easy bleeding.  RESPIRATORY: cough, wheezing, or shortness of breath  CARDIOVASCULAR/Circulation: extremity edema, syncope, hypertension, tachycardia, or angina.  GASTROINTESTINAL: abdominal pain, nausea/emesis, diarrhea, constipation,  hematochezia, or melena.  ENDOCRINE:  diabetes, steroid use,  thyroid disease or osteoporosis.  MUSCULOSKELETAL: neck pain, back pain, arthralgia, arthritis, or gout.  GENITOURINARY:  frequency, urgency, dysuria, difficulty voiding, hematuria or incontinence.  NEUROLOGIC: weakness, numbness, paresthesias, seizure, tremor, stroke or memory loss.  PSYCHIATRIC: depression, anxiety, stress, suicidal thoughts or mood swings.     Objective:    Physical Exam:  Vitals:    02/21/20 1337   BP: 133/89   Pulse: 117   SpO2: 96%     Exam:  Constitutional: Well  developed, well nourished, appears stated age.  HEENT: Head atraumatic, normocephalic. Eyes without conjunctival injection or jaundice. Oropharynx clear. Neck supple. No obvious neck masses.  Skin: No rash, lesions, or petechiae of exposed skin.   Extremities: Peripheral pulses intact. No clubbing, cyanosis, or edema.  Psychiatric/mental status: Alert, without lethargy or stupor. Speech fluent. Appropriate affect. Mood normal. Able to follow commands without difficulty.     Musculoskeletal exam:  Able to walk on the heels and toes with some difficulty. Patient has a normal gait.   Normal bulk and tone. Unremarkable spinal curvature.     Cervical spine:   Range of motion within normal limits    Tenderness in the cervical paraspinal muscles.No    Thoracic spine:    Kyphosis. No   Tenderness in the thoracic paraspinal muscles.No    Lumbar spine:    Flex:  90 degrees   Ext: 20 degrees   Tenderness in the lumbar paraspinal muscles.Yes-substantial    Myofascial tenderness:  lumbar paraspinals  Focal tenderness: bilateral SI joint tenderness. No gluteal, piriformis, GT, or IT tenderness  Straight leg raise: negative   FADIR: negative     Hip exam:   Normal internal and external range of motion bilaterally. LILLI negative .       Neurologic exam:  CN:  Cranial nerves 2-12 are grossly intact  Motor:  5/5 UE and LE strength  Strength:       C4 (shoulder shrug)  symmetric 5/5       C5 (shoulder abduction) symmetric 5/5       C6 (elbow flexion) symmetric 5/5       C7 (elbow extension) symmetric 5/5       C8 (finger abduction, thumb flexion) symmetric 5/5    Reflexes:     Biceps:     R:  2/4 L: 2/4   Brachioradialis   R:  2/4 L: 2/4   Patella:  R:  2/4 L: 2/4   Achilles:  R:  2/4 L: 2/4    Sensory:   Light touch: normal bilateral upper and lower extremities    No allodynia, dysesthesia, or hyperalgesia.    Assessment:  Helena Berrios is a 39 year old female with a past medical history significant for HTN, prediabetes, anxiety,  and insomnia who presents with complaints of bilateral knee and back pain.     1. Bilateral knee pain- etiology unclear, no evidence of arthritis.  2. Low back pain- seems to be primarily myofascial.   3. Mental Health - the patient's mental health concerns, specifically anxiety, affect her experience of pain and contribute to her clinically significant distress.      Plan:  The following recommendations were given to the patient. Diagnosis, treatment options, risks, benefits, and alternatives were discussed, and all questions were answered. The patient expressed understanding of the plan for management.     I am recommending a multidisciplinary treatment plan to help this patient better manage her pain.  This includes:      1.  Pain Physical Therapy:     YES   Discussed the benefits of pain physical therapy. I would recommend this resource however Helena would like to start by focusing on chiropractic/acupuncture and establishing with therapist. We will hold off on for now but consider in the future.    2.  Pain Psychologist to address relaxation, behavioral change, coping style, and other factors important to improvement.    NO   Not at this time. Helena has an appointment with a primary therapist next week. Advised she go to this appointment. We may consider pain psychology in the future.    3.  Medication Management:     1. Helena is on several mood/pain medications with unclear benefit. Amitriptyline and Cymbalta were both recently increased with unclear benefit. Advised she monitor benefit from recent changes. We did discuss that she is already on four serotenergic medications and is at risk for serotonin syndrome- Cymbalta, Flexeril, Amitriptyline, and Remeron, plus Imitrex as needed. She may benefit from medication review and consolidation, would recommend referral to psychiatry or medication management pharmacist.      2. Unclear if Voltaren is helpful for knee pain, does help shoulder pain. Refilled  today,  advised she apply to knees up to four times daily.      3. Advised she discontinue Mobic as she is already taking Celebrex.    4.  Potential procedures: genicular nerve block to radiofrequency ablation process was discussed at last visit with ESTHER Villela. This may be an option but I wonder if another injection may be more appropriate/helpful. Advised she discuss with ortho. May consider trigger point injections for back pain.    5.  Referrals: given persistence and worsening of bilateral knee pain advised ortho referral. She is interested in, order placed.   6.  Start chiropractic care with Bev Burns DC as planned.    7.  Follow up with ESTEBAN Coello CNP in 4 weeks.       Review of Electronic Chart: Today I have also reviewed available medical information in the patient's medical record at [facility] ([facility]), including relevant provider notes, laboratory work, and imaging.       I spent 60 minutes of time face to face with the patient.  Greater than 50% of this time was spent in patient counseling and/or coordination of care regarding principles of multidisciplinary care, medication management, and treatment options as discussed above.      ESTEBAN Coello CNP  [facility]

## 2020-02-20 NOTE — TELEPHONE ENCOUNTER
"Requested Prescriptions   Pending Prescriptions Disp Refills     zolpidem 5 MG PO tablet 15 tablet 5     Sig: Take 1 tablet (5 mg) by mouth nightly as needed for sleep To last 30 days       There is no refill protocol information for this order        cyclobenzaprine 10 MG PO tablet 90 tablet 0     Sig: Take 0.5-1 tablets (5-10 mg) by mouth 3 times daily as needed for muscle spasms       There is no refill protocol information for this order        mirtazapine 30 MG PO tablet 90 tablet 1     Sig: Take 1 tablet (30 mg) by mouth At Bedtime       Atypical Antidepressants Protocol Passed - 2/20/2020 10:41 AM        Passed - Recent (12 mo) or future (30 days) visit within the authorizing provider's specialty     Patient has had an office visit with the authorizing provider or a provider within the authorizing providers department within the previous 12 mos or has a future within next 30 days. See \"Patient Info\" tab in [facility], or \"Choose Columns\" in Meds & Orders section of the refill encounter.              Passed - Medication active on med list        Passed - Patient is age 18 or older        Passed - No active pregnancy on record        Passed - No positive pregnancy test in past 12 mos        amitriptyline 100 MG PO tablet 90 tablet 0     Sig: Take 1 tablet (100 mg) by mouth At Bedtime       Tricyclic Agents ( Annual appt and no PHQ9) Failed - 2/20/2020 10:41 AM        Failed - Blood Pressure under 140/90 in past 12 mos     BP Readings from Last 3 Encounters:   01/28/20 (!) 132/92   08/07/19 130/84   07/31/19 138/80                 Passed - Recent (12 mo) or future (30 days) visit within authorizing provider's specialty     Patient has had an office visit with the authorizing provider or a provider within the authorizing providers department within the previous 12 mos or has a future within next 30 days. See \"Patient Info\" tab in inbasket, or \"Choose Columns\" in Meds & Orders section of the refill encounter.       "        Passed - Medication is active on med list        Passed - Patient is age 18 or older        Passed - Patient is not pregnant        Passed - No positive pregnancy test on record in past 12 mos        Last Written Prescription Date:  Amitriptyline 1/28/20  Last Fill Quantity: 90,  # refills: 0   Last office visit: 1/28/2020 with prescribing provider:      Future Office Visit:   Last Written Prescription Date:  Cyclobenzaprine 9.17.19  Last Fill Quantity: 90,  # refills: 0   Last office visit: 1/28/2020 with prescribing provider:      Future Office Visit:   Last Written Prescription Date:  remeron 11/20/19  Last Fill Quantity: 90,  # refills: 1   Last office visit: 1/28/2020 with prescribing provider:      Future Office Visit:   Last Written Prescription Date:  ambien  Last Fill Quantity: 15,  # refills: 5   Last office visit: 1/28/2020 with prescribing provider:      Future Office Visit:   Next 5 appointments (look out 90 days)    Feb 21, 2020  2:00 PM CST  Return Visit with ESTEBAN Ruiz CNP  [facility] ([facility]) [address] [address] [address]  [phone]           Next 5 appointments (look out 90 days)    Feb 21, 2020  2:00 PM CST  Return Visit with ESTEBAN Ruiz CNP  [facility] ([facility]) [address] [address] [address]  [phone]           Next 5 appointments (look out 90 days)    Feb 21, 2020  2:00 PM CST  Return Visit with ESTEBAN Ruiz CNP  [facility] ([facility]) [address] [address] [address]  [phone]           Next 5 appointments (look out 90 days)    Feb 21, 2020  2:00 PM CST  Return Visit with ESTEBAN Ruiz CNP  [facility] ([facility]) [address] [address] [address]  [phone]

## 2020-02-21 ENCOUNTER — OFFICE VISIT (OUTPATIENT)
Dept: PALLIATIVE MEDICINE | Facility: CLINIC | Age: 40
End: 2020-02-21
Payer: COMMERCIAL

## 2020-02-21 ENCOUNTER — MYC REFILL (OUTPATIENT)
Dept: FAMILY MEDICINE | Facility: CLINIC | Age: 40
End: 2020-02-21

## 2020-02-21 VITALS — SYSTOLIC BLOOD PRESSURE: 133 MMHG | DIASTOLIC BLOOD PRESSURE: 89 MMHG | OXYGEN SATURATION: 96 % | HEART RATE: 117 BPM

## 2020-02-21 DIAGNOSIS — M25.561 BILATERAL CHRONIC KNEE PAIN: Primary | ICD-10-CM

## 2020-02-21 DIAGNOSIS — G89.29 CHRONIC BILATERAL LOW BACK PAIN WITHOUT SCIATICA: ICD-10-CM

## 2020-02-21 DIAGNOSIS — M79.18 MYOFASCIAL PAIN: ICD-10-CM

## 2020-02-21 DIAGNOSIS — M54.50 CHRONIC BILATERAL LOW BACK PAIN WITHOUT SCIATICA: ICD-10-CM

## 2020-02-21 DIAGNOSIS — M25.562 BILATERAL CHRONIC KNEE PAIN: Primary | ICD-10-CM

## 2020-02-21 DIAGNOSIS — F41.9 ANXIETY: ICD-10-CM

## 2020-02-21 DIAGNOSIS — G89.29 CHRONIC BILATERAL LOW BACK PAIN, WITH SCIATICA PRESENCE UNSPECIFIED: ICD-10-CM

## 2020-02-21 DIAGNOSIS — G89.29 BILATERAL CHRONIC KNEE PAIN: Primary | ICD-10-CM

## 2020-02-21 DIAGNOSIS — G47.9 SLEEP DISTURBANCE: ICD-10-CM

## 2020-02-21 DIAGNOSIS — M54.5 CHRONIC BILATERAL LOW BACK PAIN, WITH SCIATICA PRESENCE UNSPECIFIED: ICD-10-CM

## 2020-02-21 PROCEDURE — 99214 OFFICE O/P EST MOD 30 MIN: CPT | Performed by: NURSE PRACTITIONER

## 2020-02-21 RX ORDER — ZOLPIDEM TARTRATE 5 MG/1
5 TABLET ORAL
Qty: 15 TABLET | Refills: 5 | OUTPATIENT
Start: 2020-02-21

## 2020-02-21 RX ORDER — CYCLOBENZAPRINE HCL 10 MG
5-10 TABLET ORAL 3 TIMES DAILY PRN
Qty: 90 TABLET | Refills: 0 | OUTPATIENT
Start: 2020-02-21

## 2020-02-21 ASSESSMENT — PAIN SCALES - GENERAL: PAINLEVEL: EXTREME PAIN (8)

## 2020-02-21 NOTE — Clinical Note
Euseibo Rivas,I saw Helena in clinic today for transfer of pain management (she moved). I was wondering if you would mind placing a referral for psychiatry for her. She continues to struggle with insomnia and anxiety despite being on several different medications. She is a bit tricky as [facility]. Please let me know if you have questions.Sybil

## 2020-02-21 NOTE — PATIENT INSTRUCTIONS
1.  Pain Physical Therapy:     YES   Hold off on pain physical therapy for now but consider in the future.    2.  Pain Psychologist to address relaxation, behavioral change, coping style, and other factors important to improvement.    NO   Not at this time. Go to appointment with therapist Barbara next week as planned.    3.  Medication Management:     1. Continue current medication regimen for now and monitor benefit with recent adjustments. Would recommend psychiatry referral to review medications and consolidate.     2. Refilled Voltaren today, apply to knees up to four times daily.     4.  Potential procedures: discuss with ortho, may consider genicular nerve block to radiofrequency ablation process.     5.  Referrals: ortho referral placed today to discuss bilateral knees.   6.  Start chiropractic care as planned.    7.  Follow up with ESTEBAN Coello CNP in 4 weeks.     ----------------------------------------------------------------  Clinic Number:  [phone]     Call with any questions about your care and for scheduling assistance.     Calls are returned Monday through Friday between 8 AM and 4:30 PM. We usually get back to you within 2 business days depending on the issue/request.    If we are prescribing your medications:    For opioid medication refills, call the clinic or send a [employer] message 7 days in advance.  Please include:    Name of requested medication    Name of the pharmacy.    For non-opioid medications, call your pharmacy directly to request a refill. Please allow 3-4 days to be processed.     Per [place] State Law:    All controlled substance prescriptions must be filled within 30 days of being written.      For those controlled substances allowing refills, pickup must occur within 30 days of last fill.      We believe regular attendance is key to your success in our program!      Any time you are unable to keep your appointment we ask that you call us at least 24 hours in advance to  cancel.This will allow us to offer the appointment time to another patient.     Multiple missed appointments may lead to dismissal from the clinic.

## 2020-02-21 NOTE — TELEPHONE ENCOUNTER
Please contact patient. Per my chart message from patient she is now living in [place] again. I would have her contact her primary care provider at [facility] for these refills and continue care with her now that she is down there.     Thanks,  ESTEBAN Giraldo CNP

## 2020-02-24 ENCOUNTER — TELEPHONE (OUTPATIENT)
Dept: PALLIATIVE MEDICINE | Facility: CLINIC | Age: 40
End: 2020-02-24

## 2020-02-24 RX ORDER — MIRTAZAPINE 30 MG/1
30 TABLET, FILM COATED ORAL AT BEDTIME
Qty: 90 TABLET | Refills: 1 | Status: CANCELLED | OUTPATIENT
Start: 2020-02-24

## 2020-02-24 RX ORDER — AMITRIPTYLINE HYDROCHLORIDE 100 MG/1
100 TABLET ORAL AT BEDTIME
Qty: 90 TABLET | Refills: 0 | Status: CANCELLED | OUTPATIENT
Start: 2020-02-24

## 2020-02-24 RX ORDER — ZOLPIDEM TARTRATE 5 MG/1
5 TABLET ORAL
Qty: 15 TABLET | Refills: 5 | Status: CANCELLED | OUTPATIENT
Start: 2020-02-24

## 2020-02-24 RX ORDER — CYCLOBENZAPRINE HCL 10 MG
5-10 TABLET ORAL 3 TIMES DAILY PRN
Qty: 90 TABLET | Refills: 0 | Status: CANCELLED | OUTPATIENT
Start: 2020-02-24

## 2020-02-24 NOTE — TELEPHONE ENCOUNTER
Requested Prescriptions   Pending Prescriptions Disp Refills     zolpidem (AMBIEN) 5 MG tablet 15 tablet 5     Sig: Take 1 tablet (5 mg) by mouth nightly as needed for sleep To last 30 days       There is no refill protocol information for this order    Last Written Prescription Date:  9/3/19  Last Fill Quantity: 15,  # refills: 3   Last office visit: 1/28/2020 with prescribing provider:     Future Office Visit:   Next 5 appointments (look out 90 days)    Mar 20, 2020  2:00 PM CDT  Return Visit with ESTEBAN Ruiz CNP  [facility] ([facility]) [address]  [address]  [facility] [address]  [phone]           Routing refill request to provider for review/approval because:  Drug not on the [facility], [facility] or [facility] refill protocol or controlled substance          cyclobenzaprine (FLEXERIL) 10 MG tablet 90 tablet 0     Sig: Take 0.5-1 tablets (5-10 mg) by mouth 3 times daily as needed for muscle spasms       There is no refill protocol information for this order    Last Written Prescription Date:  9/17/19  Last Fill Quantity: 90,  # refills: 0   Last office visit: 1/28/2020 with prescribing provider:     Future Office Visit:   Next 5 appointments (look out 90 days)    Mar 20, 2020  2:00 PM CDT  Return Visit with ESTEBAN Ruiz CNP  [facility] ([facility]) [address]  [address]  [facility] [place] [address]  [phone]           Routing refill request to provider for review/approval because:  Drug not on the [facility], [facility] or [facility] refill protocol or controlled substance          mirtazapine (REMERON) 30 MG tablet 90 tablet 1     Sig: Take 1 tablet (30 mg) by mouth At Bedtime       Atypical Antidepressants Protocol Passed - 2/24/2020 11:47 AM        Passed - Recent (12 mo) or future (30 days) visit within the authorizing provider's specialty     Patient has had an office visit with the authorizing provider or a  "provider within the authorizing providers department within the previous 12 mos or has a future within next 30 days. See \"Patient Info\" tab in inbasket, or \"Choose Columns\" in Meds & Orders section of the refill encounter.      Last Written Prescription Date:  11/20/19  Last Fill Quantity: 90,  # refills: 1   Last office visit: 1/28/2020 with prescribing provider:     Future Office Visit:   Next 5 appointments (look out 90 days)    Mar 20, 2020  2:00 PM CDT  Return Visit with ESTEBAN Ruiz CNP  [facility] ([facility]) [address]  [address]  [facility] [address]  [phone]                     Passed - Medication active on med list        Passed - Patient is age 18 or older        Passed - No active pregnancy on record        Passed - No positive pregnancy test in past 12 mos        amitriptyline (ELAVIL) 100 MG tablet 90 tablet 0     Sig: Take 1 tablet (100 mg) by mouth At Bedtime       Tricyclic Agents ( Annual appt and no PHQ9) Passed - 2/24/2020 11:47 AM        Passed - Blood Pressure under 140/90 in past 12 mos     BP Readings from Last 3 Encounters:   02/21/20 133/89   01/28/20 (!) 132/92   08/07/19 130/84                 Passed - Recent (12 mo) or future (30 days) visit within authorizing provider's specialty     Patient has had an office visit with the authorizing provider or a provider within the authorizing providers department within the previous 12 mos or has a future within next 30 days. See \"Patient Info\" tab in inbasket, or \"Choose Columns\" in Meds & Orders section of the refill encounter.      Last Written Prescription Date:  1/28/20  Last Fill Quantity: 90,  # refills: 0   Last office visit: 1/28/2020 with prescribing provider:     Future Office Visit:   Next 5 appointments (look out 90 days)    Mar 20, 2020  2:00 PM CDT  Return Visit with ESTEBAN Ruiz CNP  [facility] ([facility]) " [address] [address]  [phone]                     Passed - Medication is active on med list        Passed - Patient is age 18 or older        Passed - Patient is not pregnant        Passed - No positive pregnancy test on record in past 12 mos

## 2020-02-24 NOTE — TELEPHONE ENCOUNTER
Helena would benefit from a referral to psychiatry, but this order should be placed by her primary care provider.  I did send a staff message to previous primary care provider but received response today that she prefers this come from [place]'s new PCP [place]. Could we please call Helena and advise her that this order will need to come from a new PCP [place] or, alternatively, from her primary care provider with [facility]. Thanks!    ESTEBAN Coello CNP  [facility]

## 2020-02-24 NOTE — TELEPHONE ENCOUNTER
Per 02-19-20 MyChart MyChart refill encounter-    Note      Please contact patient. Per my chart message from patient she is now living in [place] again. I would have her contact her primary care provider at [facility] for these refills and continue care with her now that she is down there.      Thanks,  Evelyn White, APRN CNP        LM to call clinic nurse.  JAIRO Toribio RN

## 2020-02-25 ENCOUNTER — MYC MEDICAL ADVICE (OUTPATIENT)
Dept: PALLIATIVE MEDICINE | Facility: CLINIC | Age: 40
End: 2020-02-25

## 2020-02-25 DIAGNOSIS — M62.838 MUSCLE SPASM: Primary | ICD-10-CM

## 2020-02-25 NOTE — TELEPHONE ENCOUNTER
Routing to provider to review prepped reorder of  Flexeril-unsure of dosing/quantity     Roberto VargasSybil tolentino will go ahead with a month supply of Flexeril for you. In the meantime, she would like you to make your appointment with psychiatry so they can review your medications.  As discussed in your visit, you are on several medications that can put you at risk for serotonin syndrome. I had left you a voicemail earlier to let you know that Niyah thought you would benefit from a referral to psychiatry.  But this order should be placed by your primary care provider.  She did send a message to your previous primary care provider but she received response today that they preferred this come from your new PCP [place].  I would recommened that you contact your new primary care provider to request this order or if they will not it can also come from your primary care provider with [facility]. Thanks!    Jesica JOHNSON, RN Care Coordinator  [facility]  Pain Management      ----- Message -----     From: Helena Berrios     Sent: 2/25/2020  1:52 PM CST       To: Yari CORRAL  Subject: Updates about my health    So I had I had ran out of my flexeral for about a week. Over the weekends I was on the fence about going into the ER because the pain was so unbearable. I started my flexeral last night and my knee feels almost 10 times better. Now I am scared about stopping this because I can't do that pain where I need crutches again.

## 2020-02-25 NOTE — TELEPHONE ENCOUNTER
Called patient and LM to call back to discuss message from her provider.     Jesica JOHNSON, RN Care Coordinator  [facility]  Pain [facility]

## 2020-02-27 RX ORDER — CYCLOBENZAPRINE HCL 10 MG
5-10 TABLET ORAL 3 TIMES DAILY PRN
Qty: 90 TABLET | Refills: 0 | Status: SHIPPED | OUTPATIENT
Start: 2020-02-27 | End: 2020-03-19

## 2020-02-27 NOTE — TELEPHONE ENCOUNTER
Signed Prescriptions:                        Disp   Refills    cyclobenzaprine (FLEXERIL) 10 MG tablet    90 tab*0        Sig: Take 0.5-1 tablets (5-10 mg) by mouth 3 times daily           as needed for muscle spasms  Authorizing Provider: REJI JAMES    Agree with plan as laid out by nursing.    ESTEBAN Coello [facility] Management

## 2020-02-28 NOTE — TELEPHONE ENCOUNTER
Per 02-21-20 MyChart refill request.  Already got it all changed over this last week.         Helena, Per my chart message from patient she is now living in [place] again. I would have her contact her primary care provider at [facility] for these refills and continue care with her now that she is down there.       Thanks,   ESTEBAN Giraldo CNP

## 2020-03-11 ENCOUNTER — HEALTH MAINTENANCE LETTER (OUTPATIENT)
Age: 40
End: 2020-03-11

## 2020-03-17 ENCOUNTER — MYC REFILL (OUTPATIENT)
Dept: FAMILY MEDICINE | Facility: CLINIC | Age: 40
End: 2020-03-17

## 2020-03-17 ENCOUNTER — MYC REFILL (OUTPATIENT)
Dept: PALLIATIVE MEDICINE | Facility: CLINIC | Age: 40
End: 2020-03-17

## 2020-03-17 DIAGNOSIS — M62.838 MUSCLE SPASM: ICD-10-CM

## 2020-03-17 DIAGNOSIS — G47.9 SLEEP DISTURBANCE: ICD-10-CM

## 2020-03-17 RX ORDER — CYCLOBENZAPRINE HCL 10 MG
5-10 TABLET ORAL 3 TIMES DAILY PRN
Qty: 90 TABLET | Refills: 0 | Status: CANCELLED | OUTPATIENT
Start: 2020-03-17

## 2020-03-17 NOTE — TELEPHONE ENCOUNTER
Requested Prescriptions   Pending Prescriptions Disp Refills     zolpidem (AMBIEN) 5 MG tablet 15 tablet 5     Sig: Take 1 tablet (5 mg) by mouth nightly as needed for sleep To last 30 days       There is no refill protocol information for this order        Last Written Prescription Date:  09/03/19  Last Fill Quantity: 15,  # refills: 5   Last office visit: 1/28/2020 with prescribing provider:  02/18/20   Future Office Visit:   Next 5 appointments (look out 90 days)    Mar 20, 2020  2:00 PM CDT  Return Visit with ESTEBAN Ruiz [facility] ([facility] [place]) [address]  [address] [place] [id number] [phone]

## 2020-03-18 ENCOUNTER — TELEPHONE (OUTPATIENT)
Dept: PALLIATIVE MEDICINE | Facility: CLINIC | Age: 40
End: 2020-03-18

## 2020-03-18 RX ORDER — ZOLPIDEM TARTRATE 5 MG/1
5 TABLET ORAL
Qty: 15 TABLET | Refills: 5 | OUTPATIENT
Start: 2020-03-18

## 2020-03-18 NOTE — TELEPHONE ENCOUNTER
Received Leixir message requesting refill(s) for cyclobenzaprine (FLEXERIL) 10 MG tablet     Last refilled on 03/09/20    Pt last seen on 02/21/20    Next appt scheduled for 03/20/20    Please E-scribe to:    [id number] - [place] - [address]  [address]  [place] [address]  Phone: [phone] Fax: [phone]    Will facilitate refill.    Ginette Silver [facility]  [place]

## 2020-03-18 NOTE — TELEPHONE ENCOUNTER
Left message for patient to call back to acknowledge appointment change, and confirm phone number.     Patient's return visit will be converted to Telephone Visit due to COVID-19.      We re taking every precaution to prevent the spread of COVID-19. Our top priority is to protect and care for our patients.    After review by your provider, we are changing your visit to a telephone appointment.    Your visit is at 2:00pm.  We will be calling you 15-20 min early for check-in with the MA. Please be available at 2:10 (20 min prior to appointment) for this check in.      If you have concerns about this plan, please let us know, and we will send a message to the nurses to further discuss your concerns.    Alicia Belcher, CMA on 3/18/2020 at 10:32 AM

## 2020-03-19 RX ORDER — CYCLOBENZAPRINE HCL 10 MG
5-10 TABLET ORAL 3 TIMES DAILY PRN
Qty: 90 TABLET | Refills: 0 | Status: SHIPPED | OUTPATIENT
Start: 2020-03-19 | End: 2020-03-20

## 2020-03-19 NOTE — PROGRESS NOTES
"  Helena Berrios is a 39 year old female who is being evaluated via a billable telephone visit.      The patient has been notified of following:     \"This telephone visit will be conducted via a call between you and your physician/provider. We have found that certain health care needs can be provided without the need for a physical exam.  This service lets us provide the care you need with a short phone conversation.  If a prescription is necessary we can send it directly to your pharmacy.  If lab work is needed we can place an order for that and you can then stop by our lab to have the test done at a later time.    If during the course of the call the physician/provider feels a telephone visit is not appropriate, you will not be charged for this service.\"     Helena Berrios complains of    Chief Complaint   Patient presents with     Pain       I have reviewed and updated the patient's Past Medical History, Social History, Family History and Medication List.    ALLERGIES  Naproxen; Oxycodone-acetaminophen; Prednisone; and Quetiapine    Additional provider notes:   -Her pain is somewhat worse than it was at last visit.  -She attributes the worsened pain to be due to recent weather change. She notes she was feeling better for a little while after last visit, attributed it to be due to Flexeril but now wonders. She has been tapering off of Flexeril as she has been on it for over 6 years and doesn't think effective. She ran out of last prescription a few days ago and couldn't fill the prescription I recently sent.   -She has been applying Voltaren gel to knees TID without benefit so stopped. She has been applying to shoulder and neck with some improvement.   -She was going to establish with a therapist as recommended at last visit but postponed this because of COVID-19 restrictions.   -She did not have a visit with Dr. Lowe with sports medicine or start chiropractic care and acupuncture with Bev Burns as planned due " to COVID-19.   -She had a visit with her primary care provider with [facility]. She advised she discontinue amitriptyline as it wasn't helping with pain. As this medication is helping significantly with sleep, Helena decided to keep taking amitriptyline. She ran out of the old prescription of 100mg and is using an old prescription of 50mg, would like a refill. Her primary care provider placed an order for psychiatry (per my direction), agrees anxiety could be better managed.   -She continues Cymbalta with some benefit for anxiety. She needs a refill. She continues Remeron at bedtime for sleep but doesn't think she needs this. She plans to decrease in the next couple of weeks and then discontinue. She is interested in consolidating medications as much as possible.   -She has an appointment with sleep medicine next month, plans to discuss insomnia.  -She would like to discontinue meloxicam and try Celebrex.     Assessment:  Helena Berrios is a 39 year old female with a past medical history significant for HTN, prediabetes, anxiety, and insomnia who presents with complaints of bilateral knee and back pain.     1. Bilateral knee pain- etiology unclear, no evidence of arthritis.  2. Low back pain- seems to be primarily myofascial.   3. Mental Health - the patient's mental health concerns, specifically anxiety, affect her experience of pain and contribute to her clinically significant distress.      Plan:     1.  Pain Physical Therapy:     YES   Again would recommend this resource but we will need to hold off on for now due to COVID-19 restrictions. Also, Helena would like to focus on chiropractic care and acupuncture prior to starting this resource.    2.  Pain Psychologist to address relaxation, behavioral change, coping style, and other factors important to improvement.     NO   We can hold off on for now but recommended she re-schedule visit with primary therapist when able.    3.  Medication Management:     1. Helena ran  out of Flexeril a few days ago, isn't sure if this medication has been helping. We discussed due to polypharmacy and multiple serotonergic medications we will not restart.    2. Cymbalta 120mg daily seems effective for anxiety and Amitriptyline 100mg beneficial for sleep. Reasonable to continue these medications at this time.    3. Helena is unsure if Remeron has been helpful. Recommend we discontinue. Advised she continue 15mg at bedtime and reduce to 7.5mg at bedtime on 4/1/2020. She can discontinue  After 7-10 days.     4. Mobic has not been helpful. Advised she discontinue and restart Celebrex. Recommended against taking ibuprofen or other NSAIDs in addition. She will monitor benefit of these medication changes.    4.  Potential procedures: genicular nerve block to radiofrequency ablation process was discussed at last visit. This may be an option but again, I wonder if another injection may be more appropriate/helpful. Advised she discuss with ortho when able to schedule given COVID-19 restrictions. May consider trigger point injections for back pain.     5.  Referrals: go to appointment with psychiatry and sleep medicine when able. Start chiropractic care and acupuncture when able.    6.  Follow up with ESTEBAN Coello CNP in 4 weeks.       Phone call duration: 26 minutes    ESTEBAN Aragon CNP

## 2020-03-19 NOTE — TELEPHONE ENCOUNTER
Clio message sent to patient:  Good Morning HelenaSybil did fill your Flexeril.  She did state that she cannot refill the Flexeril again. You need to make an  appointment with psychiatry or a  Mediation Therapy Management pharmacist due to concern for the number of serotonergic medications you are on. They will give you some direction on what to do with your medications.  It looks like you are talking to Sybil tomorrow at 200 pm for a telephone visit.  She can discuss this with you more in detail.      Take Care,    Raegan Rivera RN  Care Coordinator  [facility] Pain Management

## 2020-03-19 NOTE — TELEPHONE ENCOUNTER
Signed Prescriptions:                        Disp   Refills    cyclobenzaprine (FLEXERIL) 10 MG tablet    90 tab*0        Sig: Take 0.5-1 tablets (5-10 mg) by mouth 3 times daily           as needed for muscle spasms  Authorizing Provider: REJI JAMES    Refilled but I cannot refill again with Helena having an appointment with psychiatry or MTM pharmacist due to concern for the number of serotonergic medications she is on.    ESTEBAN Coello [facility]

## 2020-03-20 ENCOUNTER — VIRTUAL VISIT (OUTPATIENT)
Dept: PALLIATIVE MEDICINE | Facility: CLINIC | Age: 40
End: 2020-03-20
Payer: COMMERCIAL

## 2020-03-20 ENCOUNTER — TELEPHONE (OUTPATIENT)
Dept: PALLIATIVE MEDICINE | Facility: CLINIC | Age: 40
End: 2020-03-20

## 2020-03-20 DIAGNOSIS — G89.29 CHRONIC PAIN OF BOTH KNEES: ICD-10-CM

## 2020-03-20 DIAGNOSIS — M25.561 CHRONIC PAIN OF BOTH KNEES: ICD-10-CM

## 2020-03-20 DIAGNOSIS — F41.9 ANXIETY: ICD-10-CM

## 2020-03-20 DIAGNOSIS — M25.562 CHRONIC PAIN OF BOTH KNEES: ICD-10-CM

## 2020-03-20 PROCEDURE — 99443 ZZC PHYSICIAN TELEPHONE EVALUATION 21-30 MIN: CPT | Performed by: NURSE PRACTITIONER

## 2020-03-20 RX ORDER — CELECOXIB 200 MG/1
200 CAPSULE ORAL DAILY
Qty: 30 CAPSULE | Refills: 2 | Status: SHIPPED | OUTPATIENT
Start: 2020-03-20

## 2020-03-20 RX ORDER — AMITRIPTYLINE HYDROCHLORIDE 100 MG/1
100 TABLET ORAL AT BEDTIME
Qty: 90 TABLET | Refills: 1 | Status: SHIPPED | OUTPATIENT
Start: 2020-03-20

## 2020-03-20 RX ORDER — DULOXETIN HYDROCHLORIDE 60 MG/1
120 CAPSULE, DELAYED RELEASE ORAL DAILY
Qty: 62 CAPSULE | Refills: 1 | Status: SHIPPED | OUTPATIENT
Start: 2020-03-20

## 2020-03-20 ASSESSMENT — PAIN SCALES - GENERAL: PAINLEVEL: EXTREME PAIN (8)

## 2020-03-20 NOTE — TELEPHONE ENCOUNTER
Called patient and explained:    We re taking every precaution to prevent the spread of COVID-19. Our top priority is to protect and care for our patients.    After review by your provider, we are changing your visit to a telephone appointment.    If you have concerns about this plan, please let us know, and we will send a message to the nurses to further discuss your concerns.    Madison Belcher [facility]   Pain Management [place]

## 2020-03-23 ENCOUNTER — TELEPHONE (OUTPATIENT)
Dept: PALLIATIVE MEDICINE | Facility: CLINIC | Age: 40
End: 2020-03-23

## 2020-03-23 NOTE — TELEPHONE ENCOUNTER
Phone appointment completed.   Closing    Jesica JOHNSON, RN Care Coordinator  [facility]  Pain Management

## 2020-04-27 ENCOUNTER — PATIENT OUTREACH (OUTPATIENT)
Dept: CARE COORDINATION | Facility: CLINIC | Age: 40
End: 2020-04-27

## 2020-04-27 NOTE — PROGRESS NOTES
Clinic Care Coordination Contact    Situation: Chart Review by Care Coordinator  Background: Payor request for Proactive Outreach due to COVID-19 risk.  Assessment: pt now sees a provider in [facility] in [facility].  Recommendation: Care Coordinator will not outreach due to above.    AMY Perez, [facility] - Care Coordinator   [facility]  4/27/2020   4:03 PM  [phone]

## 2020-05-29 ENCOUNTER — TELEPHONE (OUTPATIENT)
Dept: FAMILY MEDICINE | Facility: CLINIC | Age: 40
End: 2020-05-29

## 2020-05-29 NOTE — TELEPHONE ENCOUNTER
Reason for Call:  Other prescription    Detailed comments: refill request from [facility] for Zolpidem 5mg  Not on current med list written and filled on 01.28.20 for #15    Phone Number Patient can be reached at: Home number on file [phone] (home)    Best Time: anytime    Can we leave a detailed message on this number? Not Applicable    Call taken on 5/29/2020 at 11:11 AM by Yamila Sena

## 2020-06-03 DIAGNOSIS — G47.9 SLEEP DISTURBANCE: ICD-10-CM

## 2020-06-03 RX ORDER — ZOLPIDEM TARTRATE 5 MG/1
TABLET ORAL
Qty: 15 TABLET | OUTPATIENT
Start: 2020-06-03

## 2020-06-03 NOTE — TELEPHONE ENCOUNTER
Pt looks like she has a new provider in [place]. LM to [facility] [[email or web address] Needs to virtual visit with Evelyn Cazares or needs to contact her pharmacy and have the refill sent to her new pcp. Suki Driver RN

## 2020-06-05 NOTE — TELEPHONE ENCOUNTER
left message for patient to return call.  I notified pharmacy to let patient know refill not done  Kelly Mauricio RN

## 2020-06-08 ENCOUNTER — MYC MEDICAL ADVICE (OUTPATIENT)
Dept: PALLIATIVE MEDICINE | Facility: CLINIC | Age: 40
End: 2020-06-08

## 2020-06-08 NOTE — TELEPHONE ENCOUNTER
Will leave encounter open for patient response/chart review by nursing.       Roberto Malik,     We unfortunately do not complete forms for disability or leave of absence forms. These are best discussed with your primary care provider beforehand. If needed, you are welcome to request your records/office notes from Medical Records. You are actually able to make an appointment with Sybil at any point. During these times, we are seeing patients more virtually versus in clinic. [phone]. Thanks and take care!    Jesica JOHNSON, RN Care Coordinator  [facility]  Pain Management    ---below from patient-------    I have taken this month off starting May 28th. My knees are absolutely killing me and I know its so hard to get in with covid. I asked for this month to be off or until I was able to be seen. This is the form that have given me that has to be filled out. I stand for more then 7 hours 5 days a week and I can keep but I need help or something there to get me up. Still taking my meds like I should also. With the regimin you gave me.    Jesica JOHNSON, RN Care Coordinator  [facility]  Pain Management

## 2020-06-22 ENCOUNTER — MYC MEDICAL ADVICE (OUTPATIENT)
Dept: PALLIATIVE MEDICINE | Facility: CLINIC | Age: 40
End: 2020-06-22

## 2020-06-23 NOTE — TELEPHONE ENCOUNTER
For Your Imagination message sent to patient:  Sybil Maria did review your Kindling message.  Please schedule a virtual visit now with Sybil( because you are due to see her)  and hopefully when you are due for your next follow up, we will have you do that in clinic.  I believe we will be doing in clinic visits the end of July beginning of August.  That number for scheduling is [phone].  Have a great day.    Take Care,    Raegan Rivera RN  Care Coordinator  [facility] Pain Management

## 2020-06-23 NOTE — TELEPHONE ENCOUNTER
Zuu Onlninet message from patient on 06/22/2020 at 1648:  I am wondering when I can get back into seeing you again. And I need to see how I can also get into see ortho for the knee pain that is very consistent  __________________________________________    Degree Controlshart message sent to patient:  Good Morning Helena,    It looks like Sybil placed an order for you to see ortho for your knee pain back in February 2020. Those orders are good for a year.  You can call [facility]  [phone].  They should be able to schedule an appointment for you. We are scheduling virtual visits right now.  You can call [phone] to set that up as well.  If you are looking to come into the clinic, I will ask Sybil when she will start seeing patients in clinic. Once she has responded, we will reach out to you.   Have a good day,    Raegan Rivera RN  Care Coordinator  [facility] Pain Management     Routing to provider to review

## 2020-06-23 NOTE — TELEPHONE ENCOUNTER
Agree with plan as laid out by nursing.    I am hopeful that we will be seeing patients in August but there is no definite answer at this time.    ESTEBAN Coello [facility] Pain Management

## 2020-06-29 DIAGNOSIS — M25.561 BILATERAL CHRONIC KNEE PAIN: Primary | ICD-10-CM

## 2020-06-29 DIAGNOSIS — G89.29 BILATERAL CHRONIC KNEE PAIN: Primary | ICD-10-CM

## 2020-06-29 DIAGNOSIS — F41.9 ANXIETY: ICD-10-CM

## 2020-06-29 DIAGNOSIS — M25.562 BILATERAL CHRONIC KNEE PAIN: Primary | ICD-10-CM

## 2020-06-29 NOTE — TELEPHONE ENCOUNTER
Received fax from pharmacy requesting refill(s) for diclofenac 1 % TD topical gel      Last refilled on 6/4/2020    Pt last seen on 3/20/2020  Next appt scheduled for none    E-prescribe to:    [id number] - [place], [place] - [address] AT [place] [address] & [address]     Will facilitate refill.

## 2020-07-01 NOTE — TELEPHONE ENCOUNTER
VigLink message read on 06/23/2020. No appointment has been scheduled.        Raegan Rivera RN  Care Coordinator  [facility] Pain [facility]

## 2020-08-21 ENCOUNTER — TELEPHONE (OUTPATIENT)
Dept: PALLIATIVE MEDICINE | Facility: CLINIC | Age: 40
End: 2020-08-21

## 2020-08-21 DIAGNOSIS — F41.9 ANXIETY: ICD-10-CM

## 2020-08-21 RX ORDER — AMITRIPTYLINE HYDROCHLORIDE 100 MG/1
100 TABLET ORAL AT BEDTIME
Qty: 90 TABLET | Refills: 1 | Status: CANCELLED | OUTPATIENT
Start: 2020-08-21

## 2020-08-21 NOTE — TELEPHONE ENCOUNTER
Will leave encounter open for patient response/chart review by nursing.     Walit below sent to pt    Roberto Malik,     We received a refill for amitriptyline for you but it looks like it was just filled.  You would need to make an appointment to be seen to continue participating in our pain program and for Sybil Villanueva to provide further refills. If you would like to continue to work with Sybil, please call our scheduling number [phone].  Otherwise, you can certainly request that this medication be prescribed by your primary care provider.     Jesica LUNAN, RN Care Coordinator  [facility]  Pain Management

## 2020-08-21 NOTE — TELEPHONE ENCOUNTER
Was this really just refilled on 8/17/2020? As directed with previous MyChart message, Helena needs a follow up visit if she would like to continue to participate in the pain program or would like me to continue prescribing. Alternatively she could have this medication prescribed by primary care provider.    ESTEBAN Coello [facility] Pain Management

## 2020-08-21 NOTE — TELEPHONE ENCOUNTER
Pending Prescriptions:                       Disp   Refills    amitriptyline (ELAVIL) 100 MG tablet      90 tab*1            Sig: Take 1 tablet (100 mg) by mouth At Bedtime    Last refill 08/17/2020  Last office visit 03/20/2020  Next appointment None    Graciela Chris MA

## 2020-08-26 NOTE — TELEPHONE ENCOUNTER
Called pt; there was no answer and not able to LM.     Khloe Watson, CHERYLN, RN-BC  Patient Care Supervisor  [facility] [place]

## 2020-09-01 NOTE — TELEPHONE ENCOUNTER
Called pt and LM relaying message sent via HeyAnita that has not been read.     CHERYL LoveN, RN-BC  Patient Care Supervisor  [facility] Pain Management [place]

## 2020-09-08 ENCOUNTER — MYC MEDICAL ADVICE (OUTPATIENT)
Dept: PALLIATIVE MEDICINE | Facility: CLINIC | Age: 40
End: 2020-09-08

## 2020-09-08 NOTE — TELEPHONE ENCOUNTER
Samantha message from patient on 09/08/2020 at 1118:  I think I am due for a follow up.  _________________________________________    Please call patient and set up a virtual visit with Sybil Villanueva.    Routing to scheduling    Raegan Rivera RN  Care Coordinator  [facility] Pain Management

## 2020-09-08 NOTE — TELEPHONE ENCOUNTER
No follow up appointment has been scheduled with Sybil Villanueva.    Raegan Rivera RN  Care Coordinator  [facility] Pain Management

## 2020-09-09 NOTE — TELEPHONE ENCOUNTER
CINDI to schedule virtual follow up with Rich. Gave call back number to call and schedule appointment when available.    Ludivina MORENO    [facility]

## 2020-09-09 NOTE — TELEPHONE ENCOUNTER
See encounter dated 9/8. Pt sent message to set up follow up appt.     ELIZABETH Love, RN-BC  Patient Care Supervisor  [facility] Pain Management [place]

## 2020-09-16 NOTE — TELEPHONE ENCOUNTER
No visit scheduled yet.     Message sent to pt:    Roberto Malik,     Yes, you are due for a visit. Please call the main clinic number at [phone] to schedule a video visit with Sybil Villanueva CNP. It will be good for you to reconnect about your care.     Thank you,     CHERYL LoveN, RN-BC  Patient Care Supervisor  [facility] Management [place]

## 2020-09-22 NOTE — TELEPHONE ENCOUNTER
Chart review. See encounter 06/22- pt was advised to make follow up appt via [employer], this Mychart was reviewed by pt. Pt also has had VM left instruction to read all mychart responses and to also make an appt. Closing as pt should be aware to make follow up to discuss continuation of care    Jesica JOHNSON, RN Care Coordinator  [facility]  Pain Management

## 2021-01-03 ENCOUNTER — HEALTH MAINTENANCE LETTER (OUTPATIENT)
Age: 41
End: 2021-01-03

## 2021-01-05 ENCOUNTER — MYC MEDICAL ADVICE (OUTPATIENT)
Dept: PALLIATIVE MEDICINE | Facility: CLINIC | Age: 41
End: 2021-01-05

## 2021-01-05 NOTE — TELEPHONE ENCOUNTER
Will leave encounter open for patient response/chart review by nursing.       ----------------Mychart Below from pt----------------  Am i OK to finally schedule amd appointment to see you. Its been a very long time.    --------------Mychart below response to pt----------------  Roberto Malik,     Yes, you are ok to schedule a follow up appointment.  We had called and sent a few [email or web address] messages a few months ago to you indicate that you are due to be seen if you wanted to continue care with our clinic. Sybil is currently on leave so you would be temporarily to scheduled with a different provider in her absence. You will need to call our scheduling department at [phone] to make an appointment. Thanks    Jesica JOHNSON, RN Care Coordinator  [facility]  Pain [facility]

## 2021-01-06 NOTE — TELEPHONE ENCOUNTER
Namshi message read 01/05/2021 at 1307.  No appointment has been scheduled yet.    Raegan Barksdale RN  Care Coordinator  [facility] Pain Management

## 2021-02-02 ENCOUNTER — TELEPHONE (OUTPATIENT)
Dept: PALLIATIVE MEDICINE | Facility: CLINIC | Age: 41
End: 2021-02-02
Payer: COMMERCIAL

## 2021-02-02 DIAGNOSIS — F41.9 ANXIETY: ICD-10-CM

## 2021-02-02 RX ORDER — AMITRIPTYLINE HYDROCHLORIDE 100 MG/1
100 TABLET ORAL AT BEDTIME
Qty: 90 TABLET | Refills: 1 | Status: CANCELLED | OUTPATIENT
Start: 2021-02-02

## 2021-02-02 NOTE — TELEPHONE ENCOUNTER
The patient has not been seen by ESTEBAN Ewing CNP since 3/2020. Please call patient to see if she is still using this medication.    Nohemy Moss MD  [facility] Pain Management

## 2021-02-02 NOTE — TELEPHONE ENCOUNTER
Attempts have been made to contact patient since Aug 2020. Pt has reviewed messages indicating that she needs to make a follow up if she wants to continue care with our clinic.       MyChart User Last Read On   Helena Berrios 2021  1:31 PM     [employer] sent to pt  Roberto Malik,     We have sent several messages over the past few months, the last message, read on 21 indicated that you need to make an appointment to follow up in order to continue care with our clinic. We last filled this medication in March with only one refill. We are unsure if/ how you are taking this medication.  At this time, you will need to either make an appointment for follow up (done virtually) or contact your primary care to discuss refills.   [phone]    Jesica JOHNSON, RN Care Coordinator  [facility]  Pain Management

## 2021-02-02 NOTE — TELEPHONE ENCOUNTER
Pending Prescriptions:                       Disp   Refills    amitriptyline (ELAVIL) 100 MG tablet      90 tab*1            Sig: Take 1 tablet (100 mg) by mouth At Bedtime    Last refill 01/05/21  Last office visit 03/20/20  Next appointment none     Graciela Chris MA

## 2021-02-03 ENCOUNTER — TELEPHONE (OUTPATIENT)
Dept: PALLIATIVE MEDICINE | Facility: CLINIC | Age: 41
End: 2021-02-03

## 2021-02-03 DIAGNOSIS — F41.9 ANXIETY: ICD-10-CM

## 2021-02-03 NOTE — TELEPHONE ENCOUNTER
Received fax from pharmacy requesting refill(s) for amitriptyline (ELAVIL) 100 MG tablet     Last refilled on 01/05/21    Pt last seen on 03/20/20  Next appt scheduled for None    E-prescribe to:    [id number] - [place], [place] - [address]     Will facilitate refill.      Kathrine Acuna MA  [facility] Management [place]

## 2021-02-05 RX ORDER — AMITRIPTYLINE HYDROCHLORIDE 100 MG/1
100 TABLET ORAL AT BEDTIME
Qty: 90 TABLET | Refills: 1 | OUTPATIENT
Start: 2021-02-05

## 2021-02-05 NOTE — TELEPHONE ENCOUNTER
Refused Prescriptions:                       Disp   Refills    amitriptyline (ELAVIL) 100 MG tablet       90 tab*1        Sig: Take 1 tablet (100 mg) by mouth At Bedtime    Patient has not responded to attempts to get in touch with her. She needs to make a follow up appointment prior to any prescriptions being filled.    Nohemy Moss MD  [facility] Pain Management

## 2021-02-08 NOTE — TELEPHONE ENCOUNTER
Spoke to patient, patient stated she will call [facility] line to schedule follow up  appointment with provider.       Kathrine Acuna MA  [facility] Pain Management [place]

## 2021-04-25 ENCOUNTER — HEALTH MAINTENANCE LETTER (OUTPATIENT)
Age: 41
End: 2021-04-25

## 2021-10-10 ENCOUNTER — HEALTH MAINTENANCE LETTER (OUTPATIENT)
Age: 41
End: 2021-10-10

## 2022-05-21 ENCOUNTER — HEALTH MAINTENANCE LETTER (OUTPATIENT)
Age: 42
End: 2022-05-21

## 2022-09-18 ENCOUNTER — HEALTH MAINTENANCE LETTER (OUTPATIENT)
Age: 42
End: 2022-09-18

## 2023-06-04 ENCOUNTER — HEALTH MAINTENANCE LETTER (OUTPATIENT)
Age: 43
End: 2023-06-04

## 2024-02-25 ENCOUNTER — HEALTH MAINTENANCE LETTER (OUTPATIENT)
Age: 44
End: 2024-02-25